# Patient Record
Sex: FEMALE | Race: WHITE | NOT HISPANIC OR LATINO
[De-identification: names, ages, dates, MRNs, and addresses within clinical notes are randomized per-mention and may not be internally consistent; named-entity substitution may affect disease eponyms.]

---

## 2017-09-19 ENCOUNTER — APPOINTMENT (OUTPATIENT)
Dept: OBGYN | Facility: CLINIC | Age: 32
End: 2017-09-19
Payer: MEDICARE

## 2017-09-19 VITALS
WEIGHT: 154.8 LBS | HEIGHT: 64 IN | DIASTOLIC BLOOD PRESSURE: 80 MMHG | HEART RATE: 87 BPM | SYSTOLIC BLOOD PRESSURE: 122 MMHG | BODY MASS INDEX: 26.43 KG/M2

## 2017-09-19 DIAGNOSIS — Z32.01 ENCOUNTER FOR PREGNANCY TEST, RESULT POSITIVE: ICD-10-CM

## 2017-09-19 PROCEDURE — 99395 PREV VISIT EST AGE 18-39: CPT | Mod: GY

## 2017-09-19 PROCEDURE — 99213 OFFICE O/P EST LOW 20 MIN: CPT | Mod: 25

## 2017-09-19 PROCEDURE — 76817 TRANSVAGINAL US OBSTETRIC: CPT

## 2017-09-25 LAB
ABO + RH PNL BLD: NORMAL
B19V IGG SER QL IA: 7.5 INDEX
B19V IGG+IGM SER-IMP: NORMAL
B19V IGG+IGM SER-IMP: POSITIVE
B19V IGM FLD-ACNC: 0.2 INDEX
B19V IGM SER-ACNC: NEGATIVE
BACTERIA UR CULT: ABNORMAL
BASOPHILS # BLD AUTO: 0.01 K/UL
BASOPHILS NFR BLD AUTO: 0.1 %
BLD GP AB SCN SERPL QL: NORMAL
C TRACH RRNA SPEC QL NAA+PROBE: NORMAL
CYTOLOGY CVX/VAG DOC THIN PREP: NORMAL
EOSINOPHIL # BLD AUTO: 0.1 K/UL
EOSINOPHIL NFR BLD AUTO: 1.4 %
HBV SURFACE AG SER QL: NONREACTIVE
HCT VFR BLD CALC: 40.9 %
HCV AB SER QL: NONREACTIVE
HCV S/CO RATIO: 0.15 S/CO
HGB A MFR BLD: 95.5 %
HGB A2 MFR BLD: 2.9 %
HGB BLD-MCNC: 13.7 G/DL
HGB F MFR BLD: 1.6 %
HGB FRACT BLD-IMP: NORMAL
HIV1+2 AB SPEC QL IA.RAPID: NONREACTIVE
HPV HIGH+LOW RISK DNA PNL CVX: NEGATIVE
IMM GRANULOCYTES NFR BLD AUTO: 0.3 %
LYMPHOCYTES # BLD AUTO: 1.46 K/UL
LYMPHOCYTES NFR BLD AUTO: 19.9 %
MAN DIFF?: NORMAL
MCHC RBC-ENTMCNC: 32.5 PG
MCHC RBC-ENTMCNC: 33.5 GM/DL
MCV RBC AUTO: 97.1 FL
MONOCYTES # BLD AUTO: 0.61 K/UL
MONOCYTES NFR BLD AUTO: 8.3 %
N GONORRHOEA RRNA SPEC QL NAA+PROBE: NORMAL
NEUTROPHILS # BLD AUTO: 5.15 K/UL
NEUTROPHILS NFR BLD AUTO: 70 %
PLATELET # BLD AUTO: 271 K/UL
RBC # BLD: 4.21 M/UL
RBC # FLD: 13 %
RPR SER QL: NORMAL
RUBV IGG FLD-ACNC: 2.5 INDEX
RUBV IGG SER-IMP: POSITIVE
SOURCE AMPLIFICATION: NORMAL
VZV AB TITR SER: POSITIVE
VZV IGG SER IF-ACNC: 1476 INDEX
WBC # FLD AUTO: 7.35 K/UL

## 2017-09-26 LAB
AR GENE MUT ANL BLD/T: NORMAL
CFTR MUT TESTED BLD/T: NORMAL
FMR1 GENE MUT ANL BLD/T: NORMAL

## 2017-10-18 ENCOUNTER — LABORATORY RESULT (OUTPATIENT)
Age: 32
End: 2017-10-18

## 2017-10-18 ENCOUNTER — ASOB RESULT (OUTPATIENT)
Age: 32
End: 2017-10-18

## 2017-10-18 ENCOUNTER — APPOINTMENT (OUTPATIENT)
Dept: ANTEPARTUM | Facility: CLINIC | Age: 32
End: 2017-10-18
Payer: MEDICARE

## 2017-10-18 PROCEDURE — 76801 OB US < 14 WKS SINGLE FETUS: CPT

## 2017-10-18 PROCEDURE — 76813 OB US NUCHAL MEAS 1 GEST: CPT

## 2017-10-18 PROCEDURE — 36416 COLLJ CAPILLARY BLOOD SPEC: CPT

## 2017-10-26 ENCOUNTER — APPOINTMENT (OUTPATIENT)
Dept: OBGYN | Facility: CLINIC | Age: 32
End: 2017-10-26
Payer: COMMERCIAL

## 2017-10-26 VITALS
BODY MASS INDEX: 28.34 KG/M2 | DIASTOLIC BLOOD PRESSURE: 79 MMHG | HEIGHT: 64 IN | SYSTOLIC BLOOD PRESSURE: 120 MMHG | WEIGHT: 166 LBS

## 2017-10-26 DIAGNOSIS — O99.89 OTHER SPECIFIED DISEASES AND CONDITIONS COMPLICATING PREGNANCY, CHILDBIRTH AND THE PUERPERIUM: ICD-10-CM

## 2017-10-26 DIAGNOSIS — R82.71 OTHER SPECIFIED DISEASES AND CONDITIONS COMPLICATING PREGNANCY, CHILDBIRTH AND THE PUERPERIUM: ICD-10-CM

## 2017-10-26 PROCEDURE — 0502F SUBSEQUENT PRENATAL CARE: CPT

## 2017-11-20 ENCOUNTER — APPOINTMENT (OUTPATIENT)
Dept: OBGYN | Facility: CLINIC | Age: 32
End: 2017-11-20
Payer: COMMERCIAL

## 2017-11-20 VITALS
WEIGHT: 170 LBS | DIASTOLIC BLOOD PRESSURE: 86 MMHG | HEIGHT: 55 IN | SYSTOLIC BLOOD PRESSURE: 138 MMHG | BODY MASS INDEX: 39.34 KG/M2

## 2017-11-20 PROCEDURE — 0502F SUBSEQUENT PRENATAL CARE: CPT

## 2017-11-20 PROCEDURE — 90471 IMMUNIZATION ADMIN: CPT

## 2017-11-20 PROCEDURE — 90656 IIV3 VACC NO PRSV 0.5 ML IM: CPT

## 2017-11-21 ENCOUNTER — APPOINTMENT (OUTPATIENT)
Dept: ANTEPARTUM | Facility: CLINIC | Age: 32
End: 2017-11-21
Payer: MEDICARE

## 2017-11-21 ENCOUNTER — ASOB RESULT (OUTPATIENT)
Age: 32
End: 2017-11-21

## 2017-11-21 PROCEDURE — 76815 OB US LIMITED FETUS(S): CPT

## 2017-11-21 PROCEDURE — 76817 TRANSVAGINAL US OBSTETRIC: CPT

## 2017-11-27 LAB — BACTERIA UR CULT: NORMAL

## 2017-11-29 ENCOUNTER — APPOINTMENT (OUTPATIENT)
Dept: ANTEPARTUM | Facility: CLINIC | Age: 32
End: 2017-11-29
Payer: MEDICARE

## 2017-11-29 ENCOUNTER — ASOB RESULT (OUTPATIENT)
Age: 32
End: 2017-11-29

## 2017-11-29 PROCEDURE — 76817 TRANSVAGINAL US OBSTETRIC: CPT

## 2017-11-29 PROCEDURE — 76805 OB US >/= 14 WKS SNGL FETUS: CPT

## 2017-12-20 ENCOUNTER — ASOB RESULT (OUTPATIENT)
Age: 32
End: 2017-12-20

## 2017-12-20 ENCOUNTER — APPOINTMENT (OUTPATIENT)
Dept: OBGYN | Facility: CLINIC | Age: 32
End: 2017-12-20
Payer: COMMERCIAL

## 2017-12-20 ENCOUNTER — APPOINTMENT (OUTPATIENT)
Dept: ANTEPARTUM | Facility: CLINIC | Age: 32
End: 2017-12-20
Payer: COMMERCIAL

## 2017-12-20 VITALS
WEIGHT: 176 LBS | DIASTOLIC BLOOD PRESSURE: 71 MMHG | SYSTOLIC BLOOD PRESSURE: 112 MMHG | BODY MASS INDEX: 30.05 KG/M2 | HEIGHT: 64 IN

## 2017-12-20 PROCEDURE — 76817 TRANSVAGINAL US OBSTETRIC: CPT

## 2017-12-20 PROCEDURE — 0502F SUBSEQUENT PRENATAL CARE: CPT

## 2017-12-20 PROCEDURE — 76811 OB US DETAILED SNGL FETUS: CPT

## 2017-12-20 PROCEDURE — 99241 OFFICE CONSULTATION NEW/ESTAB PATIENT 15 MIN: CPT | Mod: 25

## 2017-12-26 ENCOUNTER — APPOINTMENT (OUTPATIENT)
Dept: PEDIATRIC MEDICAL GENETICS | Facility: CLINIC | Age: 32
End: 2017-12-26
Payer: COMMERCIAL

## 2017-12-26 ENCOUNTER — LABORATORY RESULT (OUTPATIENT)
Age: 32
End: 2017-12-26

## 2017-12-26 DIAGNOSIS — O28.9 UNSPECIFIED ABNORMAL FINDINGS ON ANTENATAL SCREENING OF MOTHER: ICD-10-CM

## 2017-12-26 PROCEDURE — 96040: CPT

## 2017-12-26 PROCEDURE — 36415 COLL VENOUS BLD VENIPUNCTURE: CPT

## 2018-01-16 ENCOUNTER — APPOINTMENT (OUTPATIENT)
Dept: OBGYN | Facility: CLINIC | Age: 33
End: 2018-01-16
Payer: COMMERCIAL

## 2018-01-16 VITALS
WEIGHT: 186 LBS | HEIGHT: 64 IN | BODY MASS INDEX: 31.76 KG/M2 | SYSTOLIC BLOOD PRESSURE: 108 MMHG | DIASTOLIC BLOOD PRESSURE: 73 MMHG

## 2018-01-16 DIAGNOSIS — A63.0 ANOGENITAL (VENEREAL) WARTS: ICD-10-CM

## 2018-01-16 PROCEDURE — 56605 BIOPSY OF VULVA/PERINEUM: CPT

## 2018-01-16 PROCEDURE — 0502F SUBSEQUENT PRENATAL CARE: CPT

## 2018-01-30 LAB — CORE LAB BIOPSY: NORMAL

## 2018-02-07 ENCOUNTER — APPOINTMENT (OUTPATIENT)
Dept: OBGYN | Facility: CLINIC | Age: 33
End: 2018-02-07
Payer: COMMERCIAL

## 2018-02-07 ENCOUNTER — LABORATORY RESULT (OUTPATIENT)
Age: 33
End: 2018-02-07

## 2018-02-07 VITALS
WEIGHT: 189 LBS | HEIGHT: 64 IN | DIASTOLIC BLOOD PRESSURE: 63 MMHG | BODY MASS INDEX: 32.27 KG/M2 | SYSTOLIC BLOOD PRESSURE: 116 MMHG

## 2018-02-07 PROCEDURE — 0502F SUBSEQUENT PRENATAL CARE: CPT

## 2018-02-09 LAB
BASOPHILS # BLD AUTO: 0.02 K/UL
BASOPHILS NFR BLD AUTO: 0.2 %
EOSINOPHIL # BLD AUTO: 0.12 K/UL
EOSINOPHIL NFR BLD AUTO: 1.3 %
GLUCOSE 1H P 50 G GLC PO SERPL-MCNC: 97 MG/DL
HCT VFR BLD CALC: 36.3 %
HGB BLD-MCNC: 11.7 G/DL
IMM GRANULOCYTES NFR BLD AUTO: 0.9 %
LYMPHOCYTES # BLD AUTO: 1.71 K/UL
LYMPHOCYTES NFR BLD AUTO: 18.2 %
MAN DIFF?: NORMAL
MCHC RBC-ENTMCNC: 32.2 GM/DL
MCHC RBC-ENTMCNC: 33.1 PG
MCV RBC AUTO: 102.5 FL
MONOCYTES # BLD AUTO: 0.62 K/UL
MONOCYTES NFR BLD AUTO: 6.6 %
NEUTROPHILS # BLD AUTO: 6.84 K/UL
NEUTROPHILS NFR BLD AUTO: 72.8 %
PLATELET # BLD AUTO: 205 K/UL
RBC # BLD: 3.54 M/UL
RBC # FLD: 13.2 %
WBC # FLD AUTO: 9.39 K/UL

## 2018-02-21 ENCOUNTER — APPOINTMENT (OUTPATIENT)
Dept: OBGYN | Facility: CLINIC | Age: 33
End: 2018-02-21
Payer: COMMERCIAL

## 2018-02-21 VITALS
SYSTOLIC BLOOD PRESSURE: 118 MMHG | WEIGHT: 192 LBS | BODY MASS INDEX: 32.78 KG/M2 | HEIGHT: 64 IN | DIASTOLIC BLOOD PRESSURE: 83 MMHG

## 2018-02-21 PROCEDURE — 0502F SUBSEQUENT PRENATAL CARE: CPT

## 2018-02-21 RX ORDER — FUROSEMIDE 20 MG/1
20 TABLET ORAL
Qty: 30 | Refills: 0 | Status: DISCONTINUED | COMMUNITY
Start: 2017-07-03

## 2018-02-21 RX ORDER — AZITHROMYCIN 250 MG/1
250 TABLET, FILM COATED ORAL
Qty: 6 | Refills: 0 | Status: DISCONTINUED | COMMUNITY
Start: 2017-08-23

## 2018-03-14 ENCOUNTER — ASOB RESULT (OUTPATIENT)
Age: 33
End: 2018-03-14

## 2018-03-14 ENCOUNTER — APPOINTMENT (OUTPATIENT)
Dept: ANTEPARTUM | Facility: CLINIC | Age: 33
End: 2018-03-14
Payer: COMMERCIAL

## 2018-03-14 ENCOUNTER — APPOINTMENT (OUTPATIENT)
Dept: OBGYN | Facility: CLINIC | Age: 33
End: 2018-03-14
Payer: COMMERCIAL

## 2018-03-14 VITALS
HEIGHT: 64 IN | BODY MASS INDEX: 33.8 KG/M2 | WEIGHT: 198 LBS | SYSTOLIC BLOOD PRESSURE: 107 MMHG | DIASTOLIC BLOOD PRESSURE: 76 MMHG

## 2018-03-14 PROCEDURE — 76816 OB US FOLLOW-UP PER FETUS: CPT

## 2018-03-14 PROCEDURE — 76819 FETAL BIOPHYS PROFIL W/O NST: CPT

## 2018-03-14 PROCEDURE — 0502F SUBSEQUENT PRENATAL CARE: CPT

## 2018-03-28 ENCOUNTER — APPOINTMENT (OUTPATIENT)
Dept: OBGYN | Facility: CLINIC | Age: 33
End: 2018-03-28
Payer: COMMERCIAL

## 2018-03-28 VITALS
SYSTOLIC BLOOD PRESSURE: 123 MMHG | DIASTOLIC BLOOD PRESSURE: 81 MMHG | BODY MASS INDEX: 34.31 KG/M2 | WEIGHT: 201 LBS | HEIGHT: 64 IN

## 2018-03-28 PROCEDURE — 0502F SUBSEQUENT PRENATAL CARE: CPT

## 2018-04-04 ENCOUNTER — APPOINTMENT (OUTPATIENT)
Dept: OBGYN | Facility: CLINIC | Age: 33
End: 2018-04-04

## 2018-04-18 ENCOUNTER — APPOINTMENT (OUTPATIENT)
Dept: OBGYN | Facility: CLINIC | Age: 33
End: 2018-04-18
Payer: COMMERCIAL

## 2018-04-18 VITALS
BODY MASS INDEX: 35 KG/M2 | WEIGHT: 205 LBS | HEIGHT: 64 IN | SYSTOLIC BLOOD PRESSURE: 125 MMHG | DIASTOLIC BLOOD PRESSURE: 83 MMHG

## 2018-04-18 PROCEDURE — 0502F SUBSEQUENT PRENATAL CARE: CPT

## 2018-04-20 ENCOUNTER — OTHER (OUTPATIENT)
Age: 33
End: 2018-04-20

## 2018-04-20 LAB
GP B STREP DNA SPEC QL NAA+PROBE: DETECTED
GP B STREP DNA SPEC QL NAA+PROBE: NORMAL
SOURCE GBS: NORMAL

## 2018-04-25 ENCOUNTER — APPOINTMENT (OUTPATIENT)
Dept: OBGYN | Facility: CLINIC | Age: 33
End: 2018-04-25
Payer: COMMERCIAL

## 2018-04-25 VITALS
SYSTOLIC BLOOD PRESSURE: 120 MMHG | DIASTOLIC BLOOD PRESSURE: 84 MMHG | HEIGHT: 64 IN | WEIGHT: 204 LBS | BODY MASS INDEX: 34.83 KG/M2

## 2018-04-25 PROCEDURE — 0502F SUBSEQUENT PRENATAL CARE: CPT

## 2018-05-01 ENCOUNTER — CLINICAL ADVICE (OUTPATIENT)
Age: 33
End: 2018-05-01

## 2018-05-02 ENCOUNTER — APPOINTMENT (OUTPATIENT)
Dept: OBGYN | Facility: CLINIC | Age: 33
End: 2018-05-02
Payer: COMMERCIAL

## 2018-05-02 VITALS
HEIGHT: 64 IN | DIASTOLIC BLOOD PRESSURE: 93 MMHG | SYSTOLIC BLOOD PRESSURE: 140 MMHG | WEIGHT: 205.13 LBS | BODY MASS INDEX: 35.02 KG/M2

## 2018-05-02 VITALS — DIASTOLIC BLOOD PRESSURE: 92 MMHG | SYSTOLIC BLOOD PRESSURE: 135 MMHG

## 2018-05-02 DIAGNOSIS — Z00.00 ENCOUNTER FOR GENERAL ADULT MEDICAL EXAMINATION W/OUT ABNORMAL FINDINGS: ICD-10-CM

## 2018-05-02 PROCEDURE — 0502F SUBSEQUENT PRENATAL CARE: CPT

## 2018-05-04 ENCOUNTER — APPOINTMENT (OUTPATIENT)
Dept: OBGYN | Facility: CLINIC | Age: 33
End: 2018-05-04

## 2018-05-07 ENCOUNTER — APPOINTMENT (OUTPATIENT)
Dept: OBGYN | Facility: CLINIC | Age: 33
End: 2018-05-07

## 2018-05-07 LAB
ALBUMIN SERPL ELPH-MCNC: 3.6 G/DL
ALP BLD-CCNC: 120 U/L
ALT SERPL-CCNC: 15 U/L
ANION GAP SERPL CALC-SCNC: 15 MMOL/L
AST SERPL-CCNC: 20 U/L
BASOPHILS # BLD AUTO: 0.01 K/UL
BASOPHILS NFR BLD AUTO: 0.1 %
BILIRUB SERPL-MCNC: <0.2 MG/DL
BUN SERPL-MCNC: 15 MG/DL
CALCIUM SERPL-MCNC: 9.2 MG/DL
CHLORIDE SERPL-SCNC: 103 MMOL/L
CO2 SERPL-SCNC: 21 MMOL/L
CREAT SERPL-MCNC: 0.76 MG/DL
EOSINOPHIL # BLD AUTO: 0.17 K/UL
EOSINOPHIL NFR BLD AUTO: 1.8 %
GLUCOSE SERPL-MCNC: 101 MG/DL
HCT VFR BLD CALC: 37.9 %
HGB BLD-MCNC: 12.4 G/DL
IMM GRANULOCYTES NFR BLD AUTO: 0.5 %
LYMPHOCYTES # BLD AUTO: 2.07 K/UL
LYMPHOCYTES NFR BLD AUTO: 21.4 %
MAN DIFF?: NORMAL
MCHC RBC-ENTMCNC: 32.7 GM/DL
MCHC RBC-ENTMCNC: 34.1 PG
MCV RBC AUTO: 104.1 FL
MONOCYTES # BLD AUTO: 0.76 K/UL
MONOCYTES NFR BLD AUTO: 7.9 %
NEUTROPHILS # BLD AUTO: 6.61 K/UL
NEUTROPHILS NFR BLD AUTO: 68.3 %
PLATELET # BLD AUTO: 196 K/UL
POTASSIUM SERPL-SCNC: 4.3 MMOL/L
PROT SERPL-MCNC: 6 G/DL
RBC # BLD: 3.64 M/UL
RBC # FLD: 13.8 %
SODIUM SERPL-SCNC: 139 MMOL/L
URATE SERPL-MCNC: 4 MG/DL
WBC # FLD AUTO: 9.67 K/UL

## 2018-05-09 ENCOUNTER — APPOINTMENT (OUTPATIENT)
Dept: OBGYN | Facility: CLINIC | Age: 33
End: 2018-05-09
Payer: COMMERCIAL

## 2018-05-09 VITALS
DIASTOLIC BLOOD PRESSURE: 84 MMHG | BODY MASS INDEX: 34.92 KG/M2 | SYSTOLIC BLOOD PRESSURE: 157 MMHG | WEIGHT: 204.56 LBS | HEIGHT: 64 IN

## 2018-05-09 DIAGNOSIS — Z34.01 ENCOUNTER FOR SUPERVISION OF NORMAL FIRST PREGNANCY, FIRST TRIMESTER: ICD-10-CM

## 2018-05-09 LAB
CREAT 24H UR-MCNC: 1.7 G/24 H
CREAT ?TM UR-MCNC: 93 MG/DL
PROT 24H UR-MRATE: 7 MG/DL
PROT ?TM UR-MCNC: 24 HR
PROT UR-MCNC: 126 MG/24 H
SPECIMEN VOL 24H UR: 1800 ML

## 2018-05-09 PROCEDURE — 0502F SUBSEQUENT PRENATAL CARE: CPT

## 2018-05-15 ENCOUNTER — ASOB RESULT (OUTPATIENT)
Age: 33
End: 2018-05-15

## 2018-05-15 ENCOUNTER — APPOINTMENT (OUTPATIENT)
Dept: ANTEPARTUM | Facility: HOSPITAL | Age: 33
End: 2018-05-15

## 2018-05-15 ENCOUNTER — APPOINTMENT (OUTPATIENT)
Dept: ANTEPARTUM | Facility: CLINIC | Age: 33
End: 2018-05-15
Payer: COMMERCIAL

## 2018-05-15 ENCOUNTER — OUTPATIENT (OUTPATIENT)
Dept: OUTPATIENT SERVICES | Facility: HOSPITAL | Age: 33
LOS: 1 days | End: 2018-05-15

## 2018-05-15 PROCEDURE — 76818 FETAL BIOPHYS PROFILE W/NST: CPT | Mod: 26

## 2018-05-15 PROCEDURE — 76820 UMBILICAL ARTERY ECHO: CPT

## 2018-05-15 PROCEDURE — 76816 OB US FOLLOW-UP PER FETUS: CPT

## 2018-05-16 ENCOUNTER — APPOINTMENT (OUTPATIENT)
Dept: OBGYN | Facility: CLINIC | Age: 33
End: 2018-05-16

## 2018-05-16 ENCOUNTER — INPATIENT (INPATIENT)
Facility: HOSPITAL | Age: 33
LOS: 2 days | Discharge: HOME | End: 2018-05-19
Attending: OBSTETRICS & GYNECOLOGY | Admitting: OBSTETRICS & GYNECOLOGY
Payer: COMMERCIAL

## 2018-05-16 VITALS — WEIGHT: 184.97 LBS | HEIGHT: 64 IN

## 2018-05-16 DIAGNOSIS — O36.5990 MATERNAL CARE FOR OTHER KNOWN OR SUSPECTED POOR FETAL GROWTH, UNSPECIFIED TRIMESTER, NOT APPLICABLE OR UNSPECIFIED: ICD-10-CM

## 2018-05-16 DIAGNOSIS — O26.899 OTHER SPECIFIED PREGNANCY RELATED CONDITIONS, UNSPECIFIED TRIMESTER: ICD-10-CM

## 2018-05-16 DIAGNOSIS — Z3A.00 WEEKS OF GESTATION OF PREGNANCY NOT SPECIFIED: ICD-10-CM

## 2018-05-16 LAB
ALBUMIN SERPL ELPH-MCNC: 3.5 G/DL — SIGNIFICANT CHANGE UP (ref 3.3–5)
ALP SERPL-CCNC: 121 U/L — HIGH (ref 40–120)
ALT FLD-CCNC: 16 U/L — SIGNIFICANT CHANGE UP (ref 4–33)
APPEARANCE UR: SIGNIFICANT CHANGE UP
APTT BLD: 23.3 SEC — LOW (ref 27.5–37.4)
AST SERPL-CCNC: 24 U/L — SIGNIFICANT CHANGE UP (ref 4–32)
BACTERIA # UR AUTO: SIGNIFICANT CHANGE UP
BASOPHILS # BLD AUTO: 0.02 K/UL — SIGNIFICANT CHANGE UP (ref 0–0.2)
BASOPHILS NFR BLD AUTO: 0.2 % — SIGNIFICANT CHANGE UP (ref 0–2)
BILIRUB SERPL-MCNC: < 0.2 MG/DL — LOW (ref 0.2–1.2)
BILIRUB UR-MCNC: NEGATIVE — SIGNIFICANT CHANGE UP
BLD GP AB SCN SERPL QL: NEGATIVE — SIGNIFICANT CHANGE UP
BLOOD UR QL VISUAL: HIGH
BUN SERPL-MCNC: 18 MG/DL — SIGNIFICANT CHANGE UP (ref 7–23)
CALCIUM SERPL-MCNC: 8.9 MG/DL — SIGNIFICANT CHANGE UP (ref 8.4–10.5)
CHLORIDE SERPL-SCNC: 102 MMOL/L — SIGNIFICANT CHANGE UP (ref 98–107)
CO2 SERPL-SCNC: 19 MMOL/L — LOW (ref 22–31)
COLOR SPEC: YELLOW — SIGNIFICANT CHANGE UP
CREAT ?TM UR-MCNC: 78.15 MG/DL — SIGNIFICANT CHANGE UP
CREAT SERPL-MCNC: 0.67 MG/DL — SIGNIFICANT CHANGE UP (ref 0.5–1.3)
EOSINOPHIL # BLD AUTO: 0.12 K/UL — SIGNIFICANT CHANGE UP (ref 0–0.5)
EOSINOPHIL NFR BLD AUTO: 1.2 % — SIGNIFICANT CHANGE UP (ref 0–6)
FIBRINOGEN PPP-MCNC: 637 MG/DL — HIGH (ref 310–510)
GLUCOSE SERPL-MCNC: 80 MG/DL — SIGNIFICANT CHANGE UP (ref 70–99)
GLUCOSE UR-MCNC: NEGATIVE — SIGNIFICANT CHANGE UP
HCT VFR BLD CALC: 35.4 % — SIGNIFICANT CHANGE UP (ref 34.5–45)
HGB BLD-MCNC: 11.7 G/DL — SIGNIFICANT CHANGE UP (ref 11.5–15.5)
IMM GRANULOCYTES # BLD AUTO: 0.04 # — SIGNIFICANT CHANGE UP
IMM GRANULOCYTES NFR BLD AUTO: 0.4 % — SIGNIFICANT CHANGE UP (ref 0–1.5)
INR BLD: 0.87 — LOW (ref 0.88–1.17)
KETONES UR-MCNC: NEGATIVE — SIGNIFICANT CHANGE UP
LDH SERPL L TO P-CCNC: 265 U/L — HIGH (ref 135–225)
LEUKOCYTE ESTERASE UR-ACNC: HIGH
LYMPHOCYTES # BLD AUTO: 1.67 K/UL — SIGNIFICANT CHANGE UP (ref 1–3.3)
LYMPHOCYTES # BLD AUTO: 16.1 % — SIGNIFICANT CHANGE UP (ref 13–44)
MCHC RBC-ENTMCNC: 33.1 % — SIGNIFICANT CHANGE UP (ref 32–36)
MCHC RBC-ENTMCNC: 33.8 PG — SIGNIFICANT CHANGE UP (ref 27–34)
MCV RBC AUTO: 102.3 FL — HIGH (ref 80–100)
MONOCYTES # BLD AUTO: 0.77 K/UL — SIGNIFICANT CHANGE UP (ref 0–0.9)
MONOCYTES NFR BLD AUTO: 7.4 % — SIGNIFICANT CHANGE UP (ref 2–14)
MUCOUS THREADS # UR AUTO: SIGNIFICANT CHANGE UP
NEUTROPHILS # BLD AUTO: 7.74 K/UL — HIGH (ref 1.8–7.4)
NEUTROPHILS NFR BLD AUTO: 74.7 % — SIGNIFICANT CHANGE UP (ref 43–77)
NITRITE UR-MCNC: NEGATIVE — SIGNIFICANT CHANGE UP
NRBC # FLD: 0 — SIGNIFICANT CHANGE UP
PH UR: 6.5 — SIGNIFICANT CHANGE UP (ref 4.6–8)
PLATELET # BLD AUTO: 181 K/UL — SIGNIFICANT CHANGE UP (ref 150–400)
PMV BLD: 12.3 FL — SIGNIFICANT CHANGE UP (ref 7–13)
POTASSIUM SERPL-MCNC: 3.9 MMOL/L — SIGNIFICANT CHANGE UP (ref 3.5–5.3)
POTASSIUM SERPL-SCNC: 3.9 MMOL/L — SIGNIFICANT CHANGE UP (ref 3.5–5.3)
PROT SERPL-MCNC: 6.4 G/DL — SIGNIFICANT CHANGE UP (ref 6–8.3)
PROT UR-MCNC: 20 MG/DL — SIGNIFICANT CHANGE UP
PROT UR-MCNC: 28.9 MG/DL — SIGNIFICANT CHANGE UP
PROTHROM AB SERPL-ACNC: 9.6 SEC — LOW (ref 9.8–13.1)
RBC # BLD: 3.46 M/UL — LOW (ref 3.8–5.2)
RBC # FLD: 13.2 % — SIGNIFICANT CHANGE UP (ref 10.3–14.5)
RBC CASTS # UR COMP ASSIST: SIGNIFICANT CHANGE UP (ref 0–?)
RH IG SCN BLD-IMP: POSITIVE — SIGNIFICANT CHANGE UP
RH IG SCN BLD-IMP: POSITIVE — SIGNIFICANT CHANGE UP
SODIUM SERPL-SCNC: 135 MMOL/L — SIGNIFICANT CHANGE UP (ref 135–145)
SP GR SPEC: 1.02 — SIGNIFICANT CHANGE UP (ref 1–1.04)
SQUAMOUS # UR AUTO: SIGNIFICANT CHANGE UP
URATE SERPL-MCNC: 3.9 MG/DL — SIGNIFICANT CHANGE UP (ref 2.5–7)
UROBILINOGEN FLD QL: NORMAL MG/DL — SIGNIFICANT CHANGE UP
WBC # BLD: 10.36 K/UL — SIGNIFICANT CHANGE UP (ref 3.8–10.5)
WBC # FLD AUTO: 10.36 K/UL — SIGNIFICANT CHANGE UP (ref 3.8–10.5)
WBC UR QL: SIGNIFICANT CHANGE UP (ref 0–?)

## 2018-05-16 RX ORDER — SODIUM CHLORIDE 9 MG/ML
1000 INJECTION, SOLUTION INTRAVENOUS ONCE
Qty: 0 | Refills: 0 | Status: COMPLETED | OUTPATIENT
Start: 2018-05-16 | End: 2018-05-17

## 2018-05-16 RX ORDER — OXYTOCIN 10 UNIT/ML
2 VIAL (ML) INJECTION
Qty: 30 | Refills: 0 | Status: DISCONTINUED | OUTPATIENT
Start: 2018-05-16 | End: 2018-05-17

## 2018-05-16 RX ORDER — SODIUM CHLORIDE 9 MG/ML
1000 INJECTION, SOLUTION INTRAVENOUS
Qty: 0 | Refills: 0 | Status: DISCONTINUED | OUTPATIENT
Start: 2018-05-16 | End: 2018-05-17

## 2018-05-16 RX ORDER — CITRIC ACID/SODIUM CITRATE 300-500 MG
15 SOLUTION, ORAL ORAL EVERY 4 HOURS
Qty: 0 | Refills: 0 | Status: DISCONTINUED | OUTPATIENT
Start: 2018-05-16 | End: 2018-05-17

## 2018-05-16 RX ORDER — AMPICILLIN TRIHYDRATE 250 MG
2 CAPSULE ORAL ONCE
Qty: 0 | Refills: 0 | Status: COMPLETED | OUTPATIENT
Start: 2018-05-16 | End: 2018-05-16

## 2018-05-16 RX ORDER — AMPICILLIN TRIHYDRATE 250 MG
1 CAPSULE ORAL EVERY 4 HOURS
Qty: 0 | Refills: 0 | Status: DISCONTINUED | OUTPATIENT
Start: 2018-05-16 | End: 2018-05-17

## 2018-05-16 RX ORDER — CITRIC ACID/SODIUM CITRATE 300-500 MG
15 SOLUTION, ORAL ORAL EVERY 4 HOURS
Qty: 0 | Refills: 0 | Status: DISCONTINUED | OUTPATIENT
Start: 2018-05-16 | End: 2018-05-16

## 2018-05-16 RX ORDER — OXYTOCIN 10 UNIT/ML
333.33 VIAL (ML) INJECTION
Qty: 20 | Refills: 0 | Status: COMPLETED | OUTPATIENT
Start: 2018-05-16

## 2018-05-16 RX ORDER — SODIUM CHLORIDE 9 MG/ML
1000 INJECTION, SOLUTION INTRAVENOUS ONCE
Qty: 0 | Refills: 0 | Status: DISCONTINUED | OUTPATIENT
Start: 2018-05-16 | End: 2018-05-16

## 2018-05-16 RX ORDER — SODIUM CHLORIDE 9 MG/ML
1000 INJECTION, SOLUTION INTRAVENOUS
Qty: 0 | Refills: 0 | Status: DISCONTINUED | OUTPATIENT
Start: 2018-05-16 | End: 2018-05-16

## 2018-05-16 RX ORDER — OXYTOCIN 10 UNIT/ML
333.33 VIAL (ML) INJECTION
Qty: 20 | Refills: 0 | Status: DISCONTINUED | OUTPATIENT
Start: 2018-05-16 | End: 2018-05-16

## 2018-05-16 RX ORDER — AMPICILLIN TRIHYDRATE 250 MG
CAPSULE ORAL
Qty: 0 | Refills: 0 | Status: DISCONTINUED | OUTPATIENT
Start: 2018-05-16 | End: 2018-05-17

## 2018-05-16 RX ADMIN — SODIUM CHLORIDE 125 MILLILITER(S): 9 INJECTION, SOLUTION INTRAVENOUS at 18:00

## 2018-05-16 RX ADMIN — Medication 216 GRAM(S): at 16:00

## 2018-05-16 RX ADMIN — Medication 208 GRAM(S): at 20:14

## 2018-05-17 ENCOUNTER — TRANSCRIPTION ENCOUNTER (OUTPATIENT)
Age: 33
End: 2018-05-17

## 2018-05-17 LAB
ALBUMIN SERPL ELPH-MCNC: 2.9 G/DL — LOW (ref 3.3–5)
ALBUMIN SERPL ELPH-MCNC: 3.3 G/DL — SIGNIFICANT CHANGE UP (ref 3.3–5)
ALP SERPL-CCNC: 100 U/L — SIGNIFICANT CHANGE UP (ref 40–120)
ALP SERPL-CCNC: 114 U/L — SIGNIFICANT CHANGE UP (ref 40–120)
ALT FLD-CCNC: 12 U/L — SIGNIFICANT CHANGE UP (ref 4–33)
ALT FLD-CCNC: 17 U/L — SIGNIFICANT CHANGE UP (ref 4–33)
APTT BLD: 22.3 SEC — LOW (ref 27.5–37.4)
APTT BLD: 24.2 SEC — LOW (ref 27.5–37.4)
AST SERPL-CCNC: 19 U/L — SIGNIFICANT CHANGE UP (ref 4–32)
AST SERPL-CCNC: 21 U/L — SIGNIFICANT CHANGE UP (ref 4–32)
BASOPHILS # BLD AUTO: 0.02 K/UL — SIGNIFICANT CHANGE UP (ref 0–0.2)
BASOPHILS # BLD AUTO: 0.02 K/UL — SIGNIFICANT CHANGE UP (ref 0–0.2)
BASOPHILS NFR BLD AUTO: 0.1 % — SIGNIFICANT CHANGE UP (ref 0–2)
BASOPHILS NFR BLD AUTO: 0.2 % — SIGNIFICANT CHANGE UP (ref 0–2)
BILIRUB SERPL-MCNC: 0.4 MG/DL — SIGNIFICANT CHANGE UP (ref 0.2–1.2)
BILIRUB SERPL-MCNC: 0.5 MG/DL — SIGNIFICANT CHANGE UP (ref 0.2–1.2)
BUN SERPL-MCNC: 11 MG/DL — SIGNIFICANT CHANGE UP (ref 7–23)
BUN SERPL-MCNC: 12 MG/DL — SIGNIFICANT CHANGE UP (ref 7–23)
CALCIUM SERPL-MCNC: 8.4 MG/DL — SIGNIFICANT CHANGE UP (ref 8.4–10.5)
CALCIUM SERPL-MCNC: 8.9 MG/DL — SIGNIFICANT CHANGE UP (ref 8.4–10.5)
CHLORIDE SERPL-SCNC: 102 MMOL/L — SIGNIFICANT CHANGE UP (ref 98–107)
CHLORIDE SERPL-SCNC: 103 MMOL/L — SIGNIFICANT CHANGE UP (ref 98–107)
CO2 SERPL-SCNC: 22 MMOL/L — SIGNIFICANT CHANGE UP (ref 22–31)
CO2 SERPL-SCNC: 23 MMOL/L — SIGNIFICANT CHANGE UP (ref 22–31)
CREAT SERPL-MCNC: 0.72 MG/DL — SIGNIFICANT CHANGE UP (ref 0.5–1.3)
CREAT SERPL-MCNC: 0.77 MG/DL — SIGNIFICANT CHANGE UP (ref 0.5–1.3)
EOSINOPHIL # BLD AUTO: 0.03 K/UL — SIGNIFICANT CHANGE UP (ref 0–0.5)
EOSINOPHIL # BLD AUTO: 0.05 K/UL — SIGNIFICANT CHANGE UP (ref 0–0.5)
EOSINOPHIL NFR BLD AUTO: 0.2 % — SIGNIFICANT CHANGE UP (ref 0–6)
EOSINOPHIL NFR BLD AUTO: 0.4 % — SIGNIFICANT CHANGE UP (ref 0–6)
FIBRINOGEN PPP-MCNC: 606.1 MG/DL — HIGH (ref 310–510)
FIBRINOGEN PPP-MCNC: 609 MG/DL — HIGH (ref 310–510)
GLUCOSE SERPL-MCNC: 79 MG/DL — SIGNIFICANT CHANGE UP (ref 70–99)
GLUCOSE SERPL-MCNC: 96 MG/DL — SIGNIFICANT CHANGE UP (ref 70–99)
HCT VFR BLD CALC: 31 % — LOW (ref 34.5–45)
HCT VFR BLD CALC: 35.1 % — SIGNIFICANT CHANGE UP (ref 34.5–45)
HGB BLD-MCNC: 10.6 G/DL — LOW (ref 11.5–15.5)
HGB BLD-MCNC: 11.7 G/DL — SIGNIFICANT CHANGE UP (ref 11.5–15.5)
IMM GRANULOCYTES # BLD AUTO: 0.05 # — SIGNIFICANT CHANGE UP
IMM GRANULOCYTES # BLD AUTO: 0.06 # — SIGNIFICANT CHANGE UP
IMM GRANULOCYTES NFR BLD AUTO: 0.4 % — SIGNIFICANT CHANGE UP (ref 0–1.5)
IMM GRANULOCYTES NFR BLD AUTO: 0.4 % — SIGNIFICANT CHANGE UP (ref 0–1.5)
INR BLD: 0.88 — SIGNIFICANT CHANGE UP (ref 0.88–1.17)
INR BLD: 0.92 — SIGNIFICANT CHANGE UP (ref 0.88–1.17)
LDH SERPL L TO P-CCNC: 178 U/L — SIGNIFICANT CHANGE UP (ref 135–225)
LDH SERPL L TO P-CCNC: 178 U/L — SIGNIFICANT CHANGE UP (ref 135–225)
LYMPHOCYTES # BLD AUTO: 0.89 K/UL — LOW (ref 1–3.3)
LYMPHOCYTES # BLD AUTO: 1.56 K/UL — SIGNIFICANT CHANGE UP (ref 1–3.3)
LYMPHOCYTES # BLD AUTO: 14 % — SIGNIFICANT CHANGE UP (ref 13–44)
LYMPHOCYTES # BLD AUTO: 6.1 % — LOW (ref 13–44)
MCHC RBC-ENTMCNC: 33.3 % — SIGNIFICANT CHANGE UP (ref 32–36)
MCHC RBC-ENTMCNC: 33.6 PG — SIGNIFICANT CHANGE UP (ref 27–34)
MCHC RBC-ENTMCNC: 34.2 % — SIGNIFICANT CHANGE UP (ref 32–36)
MCHC RBC-ENTMCNC: 34.4 PG — HIGH (ref 27–34)
MCV RBC AUTO: 100.6 FL — HIGH (ref 80–100)
MCV RBC AUTO: 100.9 FL — HIGH (ref 80–100)
MONOCYTES # BLD AUTO: 0.62 K/UL — SIGNIFICANT CHANGE UP (ref 0–0.9)
MONOCYTES # BLD AUTO: 0.8 K/UL — SIGNIFICANT CHANGE UP (ref 0–0.9)
MONOCYTES NFR BLD AUTO: 5.5 % — SIGNIFICANT CHANGE UP (ref 2–14)
MONOCYTES NFR BLD AUTO: 5.5 % — SIGNIFICANT CHANGE UP (ref 2–14)
NEUTROPHILS # BLD AUTO: 12.7 K/UL — HIGH (ref 1.8–7.4)
NEUTROPHILS # BLD AUTO: 8.88 K/UL — HIGH (ref 1.8–7.4)
NEUTROPHILS NFR BLD AUTO: 79.5 % — HIGH (ref 43–77)
NEUTROPHILS NFR BLD AUTO: 87.7 % — HIGH (ref 43–77)
NRBC # FLD: 0 — SIGNIFICANT CHANGE UP
NRBC # FLD: 0 — SIGNIFICANT CHANGE UP
PLATELET # BLD AUTO: 164 K/UL — SIGNIFICANT CHANGE UP (ref 150–400)
PLATELET # BLD AUTO: 195 K/UL — SIGNIFICANT CHANGE UP (ref 150–400)
PMV BLD: 11.8 FL — SIGNIFICANT CHANGE UP (ref 7–13)
PMV BLD: 11.9 FL — SIGNIFICANT CHANGE UP (ref 7–13)
POTASSIUM SERPL-MCNC: 3.6 MMOL/L — SIGNIFICANT CHANGE UP (ref 3.5–5.3)
POTASSIUM SERPL-MCNC: 4.4 MMOL/L — SIGNIFICANT CHANGE UP (ref 3.5–5.3)
POTASSIUM SERPL-SCNC: 3.6 MMOL/L — SIGNIFICANT CHANGE UP (ref 3.5–5.3)
POTASSIUM SERPL-SCNC: 4.4 MMOL/L — SIGNIFICANT CHANGE UP (ref 3.5–5.3)
PROT SERPL-MCNC: 5.3 G/DL — LOW (ref 6–8.3)
PROT SERPL-MCNC: 6.2 G/DL — SIGNIFICANT CHANGE UP (ref 6–8.3)
PROTHROM AB SERPL-ACNC: 10.6 SEC — SIGNIFICANT CHANGE UP (ref 9.8–13.1)
PROTHROM AB SERPL-ACNC: 9.7 SEC — LOW (ref 9.8–13.1)
RBC # BLD: 3.08 M/UL — LOW (ref 3.8–5.2)
RBC # BLD: 3.48 M/UL — LOW (ref 3.8–5.2)
RBC # FLD: 13.2 % — SIGNIFICANT CHANGE UP (ref 10.3–14.5)
RBC # FLD: 13.2 % — SIGNIFICANT CHANGE UP (ref 10.3–14.5)
SODIUM SERPL-SCNC: 132 MMOL/L — LOW (ref 135–145)
SODIUM SERPL-SCNC: 135 MMOL/L — SIGNIFICANT CHANGE UP (ref 135–145)
T PALLIDUM AB TITR SER: NEGATIVE — SIGNIFICANT CHANGE UP
URATE SERPL-MCNC: 4.2 MG/DL — SIGNIFICANT CHANGE UP (ref 2.5–7)
URATE SERPL-MCNC: 4.4 MG/DL — SIGNIFICANT CHANGE UP (ref 2.5–7)
WBC # BLD: 11.18 K/UL — HIGH (ref 3.8–10.5)
WBC # BLD: 14.5 K/UL — HIGH (ref 3.8–10.5)
WBC # FLD AUTO: 11.18 K/UL — HIGH (ref 3.8–10.5)
WBC # FLD AUTO: 14.5 K/UL — HIGH (ref 3.8–10.5)

## 2018-05-17 PROCEDURE — 59400 OBSTETRICAL CARE: CPT | Mod: U9,UB,GC

## 2018-05-17 RX ORDER — IBUPROFEN 200 MG
600 TABLET ORAL EVERY 6 HOURS
Qty: 0 | Refills: 0 | Status: DISCONTINUED | OUTPATIENT
Start: 2018-05-17 | End: 2018-05-19

## 2018-05-17 RX ORDER — OXYTOCIN 10 UNIT/ML
41.67 VIAL (ML) INJECTION
Qty: 20 | Refills: 0 | Status: DISCONTINUED | OUTPATIENT
Start: 2018-05-17 | End: 2018-05-17

## 2018-05-17 RX ORDER — OXYTOCIN 10 UNIT/ML
41.67 VIAL (ML) INJECTION
Qty: 20 | Refills: 0 | Status: DISCONTINUED | OUTPATIENT
Start: 2018-05-17 | End: 2018-05-18

## 2018-05-17 RX ORDER — SODIUM CHLORIDE 9 MG/ML
3 INJECTION INTRAMUSCULAR; INTRAVENOUS; SUBCUTANEOUS EVERY 8 HOURS
Qty: 0 | Refills: 0 | Status: DISCONTINUED | OUTPATIENT
Start: 2018-05-17 | End: 2018-05-19

## 2018-05-17 RX ORDER — KETOROLAC TROMETHAMINE 30 MG/ML
30 SYRINGE (ML) INJECTION ONCE
Qty: 0 | Refills: 0 | Status: DISCONTINUED | OUTPATIENT
Start: 2018-05-17 | End: 2018-05-17

## 2018-05-17 RX ORDER — TETANUS TOXOID, REDUCED DIPHTHERIA TOXOID AND ACELLULAR PERTUSSIS VACCINE, ADSORBED 5; 2.5; 8; 8; 2.5 [IU]/.5ML; [IU]/.5ML; UG/.5ML; UG/.5ML; UG/.5ML
0.5 SUSPENSION INTRAMUSCULAR ONCE
Qty: 0 | Refills: 0 | Status: COMPLETED | OUTPATIENT
Start: 2018-05-17

## 2018-05-17 RX ORDER — SODIUM CHLORIDE 9 MG/ML
1000 INJECTION, SOLUTION INTRAVENOUS
Qty: 0 | Refills: 0 | Status: DISCONTINUED | OUTPATIENT
Start: 2018-05-17 | End: 2018-05-17

## 2018-05-17 RX ORDER — HYDROCORTISONE 1 %
1 OINTMENT (GRAM) TOPICAL EVERY 4 HOURS
Qty: 0 | Refills: 0 | Status: DISCONTINUED | OUTPATIENT
Start: 2018-05-17 | End: 2018-05-19

## 2018-05-17 RX ORDER — HYDROCORTISONE 1 %
1 OINTMENT (GRAM) TOPICAL EVERY 4 HOURS
Qty: 0 | Refills: 0 | Status: DISCONTINUED | OUTPATIENT
Start: 2018-05-17 | End: 2018-05-17

## 2018-05-17 RX ORDER — LANOLIN
1 OINTMENT (GRAM) TOPICAL EVERY 6 HOURS
Qty: 0 | Refills: 0 | Status: DISCONTINUED | OUTPATIENT
Start: 2018-05-17 | End: 2018-05-19

## 2018-05-17 RX ORDER — IBUPROFEN 200 MG
600 TABLET ORAL EVERY 6 HOURS
Qty: 0 | Refills: 0 | Status: COMPLETED | OUTPATIENT
Start: 2018-05-17 | End: 2019-04-15

## 2018-05-17 RX ORDER — DIPHENHYDRAMINE HCL 50 MG
25 CAPSULE ORAL EVERY 6 HOURS
Qty: 0 | Refills: 0 | Status: DISCONTINUED | OUTPATIENT
Start: 2018-05-17 | End: 2018-05-19

## 2018-05-17 RX ORDER — IBUPROFEN 200 MG
1 TABLET ORAL
Qty: 0 | Refills: 0 | DISCHARGE
Start: 2018-05-17

## 2018-05-17 RX ORDER — DIBUCAINE 1 %
1 OINTMENT (GRAM) RECTAL EVERY 4 HOURS
Qty: 0 | Refills: 0 | Status: DISCONTINUED | OUTPATIENT
Start: 2018-05-17 | End: 2018-05-19

## 2018-05-17 RX ORDER — AER TRAVELER 0.5 G/1
1 SOLUTION RECTAL; TOPICAL EVERY 4 HOURS
Qty: 0 | Refills: 0 | Status: DISCONTINUED | OUTPATIENT
Start: 2018-05-17 | End: 2018-05-19

## 2018-05-17 RX ORDER — DIBUCAINE 1 %
1 OINTMENT (GRAM) RECTAL EVERY 4 HOURS
Qty: 0 | Refills: 0 | Status: DISCONTINUED | OUTPATIENT
Start: 2018-05-17 | End: 2018-05-17

## 2018-05-17 RX ORDER — PRAMOXINE HYDROCHLORIDE 150 MG/15G
1 AEROSOL, FOAM RECTAL EVERY 4 HOURS
Qty: 0 | Refills: 0 | Status: DISCONTINUED | OUTPATIENT
Start: 2018-05-17 | End: 2018-05-17

## 2018-05-17 RX ORDER — OXYCODONE HYDROCHLORIDE 5 MG/1
5 TABLET ORAL
Qty: 0 | Refills: 0 | Status: DISCONTINUED | OUTPATIENT
Start: 2018-05-17 | End: 2018-05-19

## 2018-05-17 RX ORDER — DOCUSATE SODIUM 100 MG
100 CAPSULE ORAL
Qty: 0 | Refills: 0 | Status: DISCONTINUED | OUTPATIENT
Start: 2018-05-17 | End: 2018-05-19

## 2018-05-17 RX ORDER — ACETAMINOPHEN 500 MG
975 TABLET ORAL EVERY 6 HOURS
Qty: 0 | Refills: 0 | Status: DISCONTINUED | OUTPATIENT
Start: 2018-05-17 | End: 2018-05-19

## 2018-05-17 RX ORDER — MAGNESIUM HYDROXIDE 400 MG/1
30 TABLET, CHEWABLE ORAL
Qty: 0 | Refills: 0 | Status: DISCONTINUED | OUTPATIENT
Start: 2018-05-17 | End: 2018-05-19

## 2018-05-17 RX ORDER — SODIUM CHLORIDE 9 MG/ML
3 INJECTION INTRAMUSCULAR; INTRAVENOUS; SUBCUTANEOUS EVERY 8 HOURS
Qty: 0 | Refills: 0 | Status: DISCONTINUED | OUTPATIENT
Start: 2018-05-17 | End: 2018-05-17

## 2018-05-17 RX ORDER — OXYTOCIN 10 UNIT/ML
333.33 VIAL (ML) INJECTION
Qty: 20 | Refills: 0 | Status: COMPLETED | OUTPATIENT
Start: 2018-05-17 | End: 2018-05-17

## 2018-05-17 RX ORDER — ACETAMINOPHEN 500 MG
975 TABLET ORAL EVERY 6 HOURS
Qty: 0 | Refills: 0 | Status: COMPLETED | OUTPATIENT
Start: 2018-05-17 | End: 2019-04-15

## 2018-05-17 RX ORDER — AER TRAVELER 0.5 G/1
1 SOLUTION RECTAL; TOPICAL EVERY 4 HOURS
Qty: 0 | Refills: 0 | Status: DISCONTINUED | OUTPATIENT
Start: 2018-05-17 | End: 2018-05-17

## 2018-05-17 RX ORDER — IBUPROFEN 200 MG
1 TABLET ORAL
Qty: 0 | Refills: 0 | COMMUNITY
Start: 2018-05-17

## 2018-05-17 RX ORDER — SIMETHICONE 80 MG/1
80 TABLET, CHEWABLE ORAL EVERY 6 HOURS
Qty: 0 | Refills: 0 | Status: DISCONTINUED | OUTPATIENT
Start: 2018-05-17 | End: 2018-05-19

## 2018-05-17 RX ORDER — GLYCERIN ADULT
1 SUPPOSITORY, RECTAL RECTAL AT BEDTIME
Qty: 0 | Refills: 0 | Status: DISCONTINUED | OUTPATIENT
Start: 2018-05-17 | End: 2018-05-19

## 2018-05-17 RX ORDER — OXYCODONE HYDROCHLORIDE 5 MG/1
5 TABLET ORAL EVERY 4 HOURS
Qty: 0 | Refills: 0 | Status: DISCONTINUED | OUTPATIENT
Start: 2018-05-17 | End: 2018-05-19

## 2018-05-17 RX ADMIN — Medication 208 GRAM(S): at 03:59

## 2018-05-17 RX ADMIN — Medication 975 MILLIGRAM(S): at 19:39

## 2018-05-17 RX ADMIN — Medication 600 MILLIGRAM(S): at 19:00

## 2018-05-17 RX ADMIN — Medication 208 GRAM(S): at 08:08

## 2018-05-17 RX ADMIN — Medication 125 MILLIUNIT(S)/MIN: at 12:03

## 2018-05-17 RX ADMIN — Medication 975 MILLIGRAM(S): at 19:00

## 2018-05-17 RX ADMIN — Medication 208 GRAM(S): at 00:02

## 2018-05-17 RX ADMIN — Medication 1000 MILLIUNIT(S)/MIN: at 12:03

## 2018-05-17 RX ADMIN — Medication 2 MILLIUNIT(S)/MIN: at 01:16

## 2018-05-17 RX ADMIN — Medication 30 MILLIGRAM(S): at 12:03

## 2018-05-17 RX ADMIN — Medication 600 MILLIGRAM(S): at 19:39

## 2018-05-17 RX ADMIN — Medication 30 MILLIGRAM(S): at 12:02

## 2018-05-17 RX ADMIN — SODIUM CHLORIDE 125 MILLILITER(S): 9 INJECTION, SOLUTION INTRAVENOUS at 01:53

## 2018-05-17 RX ADMIN — SODIUM CHLORIDE 2000 MILLILITER(S): 9 INJECTION, SOLUTION INTRAVENOUS at 00:03

## 2018-05-17 NOTE — DISCHARGE NOTE OB - PATIENT PORTAL LINK FT
You can access the [email or web address] Patient Portal, offered by [facility], by registering with the following website: [email or web address]

## 2018-05-17 NOTE — DISCHARGE NOTE OB - CARE PLAN
Principal Discharge DX:	Post term pregnancy at 41 weeks gestation  Goal:	full recovery  Assessment and plan of treatment:	regular diet, normal activity, , follow up in office in 6 weeks.

## 2018-05-17 NOTE — DISCHARGE NOTE OB - MEDICATION SUMMARY - MEDICATIONS TO TAKE
I will START or STAY ON the medications listed below when I get home from the hospital:    ibuprofen 600 mg oral tablet  -- 1 tab(s) by mouth every 6 hours  -- Indication: For Term pregnancy delivered I will START or STAY ON the medications listed below when I get home from the hospital:    ibuprofen 600 mg oral tablet  -- 1 tab(s) by mouth every 6 hours, As Needed  -- Indication: For Pain

## 2018-05-17 NOTE — DISCHARGE NOTE OB - MATERIALS PROVIDED
[facility] [place] Screening Program/Shaken Baby Prevention Handout/Breastfeeding Mother’s Support Group Information/Guide to Postpartum Care/MMR Vaccination (VIS Pub Date: 2012)/Birth Certificate Instructions/Discharge Medication Information for Patients and Families Pocket Guide/[facility] Hearing Screen Program/Tdap Vaccination (VIS Pub Date: 2012)/[place]  Immunization Record

## 2018-05-17 NOTE — DISCHARGE NOTE OB - PROVIDER TOKENS
FREE:[LAST:[Bessy SAUCEDO],FIRST:[Negro],PHONE:[phone]],FAX:[phone]],ADDRESS:[[address] [place] [id number]]]

## 2018-05-17 NOTE — DISCHARGE NOTE OB - HOSPITAL COURSE
32 y.o. P0 admitted 18 at 41 weeks for induction of labor. PNc at [facility]- dr. Doherty , uncomplicated . pt received cytotec, progressed into labor and delivered on 18 , a live female  via   apgars 9/9 wt. 2990 grams. placenta delivered spontaneously and intact. pt sustained a small midline vaginal second degree laceration which was repaired. EBL- 400cc. pt did well and was discharged home on PPD # 2 .

## 2018-05-18 RX ADMIN — Medication 1 TABLET(S): at 12:26

## 2018-05-18 RX ADMIN — Medication 600 MILLIGRAM(S): at 12:26

## 2018-05-18 RX ADMIN — Medication 975 MILLIGRAM(S): at 09:40

## 2018-05-18 RX ADMIN — SODIUM CHLORIDE 3 MILLILITER(S): 9 INJECTION INTRAMUSCULAR; INTRAVENOUS; SUBCUTANEOUS at 02:16

## 2018-05-18 RX ADMIN — Medication 600 MILLIGRAM(S): at 17:46

## 2018-05-18 RX ADMIN — Medication 600 MILLIGRAM(S): at 06:35

## 2018-05-18 RX ADMIN — SODIUM CHLORIDE 3 MILLILITER(S): 9 INJECTION INTRAMUSCULAR; INTRAVENOUS; SUBCUTANEOUS at 10:27

## 2018-05-18 RX ADMIN — Medication 600 MILLIGRAM(S): at 12:55

## 2018-05-18 RX ADMIN — Medication 600 MILLIGRAM(S): at 18:15

## 2018-05-18 RX ADMIN — Medication 600 MILLIGRAM(S): at 06:04

## 2018-05-18 RX ADMIN — Medication 975 MILLIGRAM(S): at 10:10

## 2018-05-18 RX ADMIN — SODIUM CHLORIDE 3 MILLILITER(S): 9 INJECTION INTRAMUSCULAR; INTRAVENOUS; SUBCUTANEOUS at 14:01

## 2018-05-19 VITALS
RESPIRATION RATE: 18 BRPM | HEART RATE: 78 BPM | OXYGEN SATURATION: 98 % | SYSTOLIC BLOOD PRESSURE: 113 MMHG | TEMPERATURE: 98 F | DIASTOLIC BLOOD PRESSURE: 73 MMHG

## 2018-05-19 RX ORDER — TETANUS TOXOID, REDUCED DIPHTHERIA TOXOID AND ACELLULAR PERTUSSIS VACCINE, ADSORBED 5; 2.5; 8; 8; 2.5 [IU]/.5ML; [IU]/.5ML; UG/.5ML; UG/.5ML; UG/.5ML
0.5 SUSPENSION INTRAMUSCULAR ONCE
Qty: 0 | Refills: 0 | Status: COMPLETED | OUTPATIENT
Start: 2018-05-19 | End: 2018-05-19

## 2018-05-19 RX ADMIN — TETANUS TOXOID, REDUCED DIPHTHERIA TOXOID AND ACELLULAR PERTUSSIS VACCINE, ADSORBED 0.5 MILLILITER(S): 5; 2.5; 8; 8; 2.5 SUSPENSION INTRAMUSCULAR at 02:16

## 2018-05-19 RX ADMIN — Medication 600 MILLIGRAM(S): at 09:10

## 2018-05-19 RX ADMIN — Medication 600 MILLIGRAM(S): at 09:40

## 2018-05-19 RX ADMIN — Medication 600 MILLIGRAM(S): at 02:34

## 2018-05-19 RX ADMIN — Medication 600 MILLIGRAM(S): at 03:00

## 2018-05-19 NOTE — PROGRESS NOTE ADULT - PROBLEM SELECTOR PLAN 1
- Continue with po analgesia  - Increase ambulation  - Continue regular diet  - IV lock    Bart Zamudio MD PGY1
- encourage more out of bed and ambulation  - analgesia PRN  - regular diet  - c/w BP checks  - c/w routine pp care  - discharge planned for today    IVELISSE Pierre PGY1

## 2018-05-19 NOTE — PROGRESS NOTE ADULT - SUBJECTIVE AND OBJECTIVE BOX
Patient seen and examined at bedside, no acute overnight events. No acute complaints, pain well controlled. Patient is ambulating, voiding spontaneously, passing gas, and tolerating regular diet.    Vital Signs Last 24 Hours  T(C): 36.6 (05-18-18 @ 05:43), Max: 37 (05-17-18 @ 14:38)  HR: 73 (05-18-18 @ 05:43) (73 - 97)  BP: 135/84 (05-18-18 @ 05:43) (101/58 - 137/68)  RR: 18 (05-18-18 @ 05:43) (18 - 19)  SpO2: 100% (05-18-18 @ 05:43) (99% - 100%)    Physical Exam:  General: NAD  Abdomen: Soft, non-tender, non-distended, fundus firm  Pelvic: Lochia wnl    Labs:    Blood Type: B Positive  Antibody Screen: --  RPR: Negative               10.6   14.50 )-----------( 164      ( 05-17 @ 11:59 )             31.0                11.7   11.18 )-----------( 195      ( 05-17 @ 04:35 )             35.1                11.7   10.36 )-----------( 181      ( 05-16 @ 16:03 )             35.4         MEDICATIONS  (STANDING):  acetaminophen   Tablet. 975 milliGRAM(s) Oral every 6 hours  diphtheria/tetanus/pertussis (acellular) Vaccine (ADAcel) 0.5 milliLiter(s) IntraMuscular once  ibuprofen  Tablet 600 milliGRAM(s) Oral every 6 hours  oxyCODONE    IR 5 milliGRAM(s) Oral every 3 hours  prenatal multivitamin 1 Tablet(s) Oral daily  sodium chloride 0.9% lock flush 3 milliLiter(s) IV Push every 8 hours    MEDICATIONS  (PRN):  dibucaine 1% Ointment 1 Application(s) Topical every 4 hours PRN Perineal Discomfort  diphenhydrAMINE   Capsule 25 milliGRAM(s) Oral every 6 hours PRN Itching  docusate sodium 100 milliGRAM(s) Oral two times a day PRN Stool Softening  glycerin Suppository - Adult 1 Suppository(s) Rectal at bedtime PRN Constipation  hydrocortisone 1% Cream 1 Application(s) Topical every 4 hours PRN perineal discomfort  lanolin Ointment 1 Application(s) Topical every 6 hours PRN Sore Nipples  magnesium hydroxide Suspension 30 milliLiter(s) Oral two times a day PRN Constipation  oxyCODONE    IR 5 milliGRAM(s) Oral every 4 hours PRN Severe Pain (7 -10)  simethicone 80 milliGRAM(s) Chew every 6 hours PRN Gas  witch hazel Pads 1 Application(s) Topical every 4 hours PRN Perineal Discomfort
32y old w PEC post partum day 2    Patient seen and examined at bedside, no acute overnight events. No acute complaints, pain well controlled.  Patient is ambulating, voiding spontaneously, passing gas, and tolerating regular diet.     Denies HA, vision changes, RUQ/epigastric pain, nausea, vomiting, SOB, CP.    Vital Signs Last 24 Hours  T(C): 36.9 (05-19-18 @ 04:54), Max: 37.2 (05-18-18 @ 17:32)  HR: 78 (05-19-18 @ 04:54) (78 - 93)  BP: 113/73 (05-19-18 @ 04:54) (113/73 - 117/78)  RR: 18 (05-19-18 @ 04:54) (18 - 19)  SpO2: 98% (05-19-18 @ 04:54) (98% - 100%)    Physical Exam:  General: NAD  Abdomen: Soft, non-tender, non-distended, fundus firm  Pelvic: Lochia wnl  Ext: NTBL  Labs:  Blood type: B Positive  Rubella IgG: RPR: Negative                          10.6<L>   14.50<H> >-----------< 164    ( 05-17 @ 11:59 )             31.0<L>                        11.7   11.18<H> >-----------< 195    ( 05-17 @ 04:35 )             35.1                        11.7   10.36 >-----------< 181    ( 05-16 @ 16:03 )             35.4    05-17-18 @ 11:59      132<L>  |  103  |  11  ----------------------------<  96  3.6   |  22  |  0.72    05-17-18 @ 04:35      135  |  102  |  12  ----------------------------<  79  4.4   |  23  |  0.77    05-16-18 @ 16:03      135  |  102  |  18  ----------------------------<  80  3.9   |  19<L>  |  0.67        Ca    8.4      17 May 2018 11:59  Ca    8.9      17 May 2018 04:35  Ca    8.9      16 May 2018 16:03    TPro  5.3<L>  /  Alb  2.9<L>  /  TBili  0.5  /  DBili  x   /  AST  21  /  ALT  12  /  AlkPhos  100  05-17-18 @ 11:59  TPro  6.2  /  Alb  3.3  /  TBili  0.4  /  DBili  x   /  AST  19  /  ALT  17  /  AlkPhos  114  05-17-18 @ 04:35  TPro  6.4  /  Alb  3.5  /  TBili  < 0.2<L>  /  DBili  x   /  AST  24  /  ALT  16  /  AlkPhos  121<H>  05-16-18 @ 16:03          MEDICATIONS  (STANDING):  acetaminophen   Tablet. 975 milliGRAM(s) Oral every 6 hours  ibuprofen  Tablet 600 milliGRAM(s) Oral every 6 hours  oxyCODONE    IR 5 milliGRAM(s) Oral every 3 hours  prenatal multivitamin 1 Tablet(s) Oral daily  sodium chloride 0.9% lock flush 3 milliLiter(s) IV Push every 8 hours    MEDICATIONS  (PRN):  dibucaine 1% Ointment 1 Application(s) Topical every 4 hours PRN Perineal Discomfort  diphenhydrAMINE   Capsule 25 milliGRAM(s) Oral every 6 hours PRN Itching  docusate sodium 100 milliGRAM(s) Oral two times a day PRN Stool Softening  glycerin Suppository - Adult 1 Suppository(s) Rectal at bedtime PRN Constipation  hydrocortisone 1% Cream 1 Application(s) Topical every 4 hours PRN perineal discomfort  lanolin Ointment 1 Application(s) Topical every 6 hours PRN Sore Nipples  magnesium hydroxide Suspension 30 milliLiter(s) Oral two times a day PRN Constipation  oxyCODONE    IR 5 milliGRAM(s) Oral every 4 hours PRN Severe Pain (7 -10)  simethicone 80 milliGRAM(s) Chew every 6 hours PRN Gas  witch hazel Pads 1 Application(s) Topical every 4 hours PRN Perineal Discomfort
Anesthesia Post-op Note    Patient s/p  with epidural yesterday.     No residual anesthetic issues or complications noted.

## 2018-06-13 DIAGNOSIS — O35.8XX0 MATERNAL CARE FOR OTHER (SUSPECTED) FETAL ABNORMALITY AND DAMAGE, NOT APPLICABLE OR UNSPECIFIED: ICD-10-CM

## 2018-06-13 DIAGNOSIS — O34.43 MATERNAL CARE FOR OTHER ABNORMALITIES OF CERVIX, THIRD TRIMESTER: ICD-10-CM

## 2018-06-13 DIAGNOSIS — Z3A.41 41 WEEKS GESTATION OF PREGNANCY: ICD-10-CM

## 2018-06-13 DIAGNOSIS — O48.0 POST-TERM PREGNANCY: ICD-10-CM

## 2018-06-13 DIAGNOSIS — O99.89 OTHER SPECIFIED DISEASES AND CONDITIONS COMPLICATING PREGNANCY, CHILDBIRTH AND THE PUERPERIUM: ICD-10-CM

## 2018-06-26 ENCOUNTER — APPOINTMENT (OUTPATIENT)
Dept: OBGYN | Facility: CLINIC | Age: 33
End: 2018-06-26
Payer: COMMERCIAL

## 2018-06-26 VITALS
SYSTOLIC BLOOD PRESSURE: 103 MMHG | DIASTOLIC BLOOD PRESSURE: 74 MMHG | HEART RATE: 80 BPM | HEIGHT: 64 IN | WEIGHT: 184 LBS | BODY MASS INDEX: 31.41 KG/M2

## 2018-06-26 PROCEDURE — 0503F POSTPARTUM CARE VISIT: CPT

## 2020-01-10 ENCOUNTER — APPOINTMENT (OUTPATIENT)
Dept: OBGYN | Facility: CLINIC | Age: 35
End: 2020-01-10
Payer: COMMERCIAL

## 2020-01-10 VITALS
HEIGHT: 64 IN | DIASTOLIC BLOOD PRESSURE: 70 MMHG | BODY MASS INDEX: 27.83 KG/M2 | WEIGHT: 163 LBS | HEART RATE: 88 BPM | SYSTOLIC BLOOD PRESSURE: 101 MMHG

## 2020-01-10 DIAGNOSIS — N81.4 UTEROVAGINAL PROLAPSE, UNSPECIFIED: ICD-10-CM

## 2020-01-10 DIAGNOSIS — R10.2 PELVIC AND PERINEAL PAIN: ICD-10-CM

## 2020-01-10 PROCEDURE — 99213 OFFICE O/P EST LOW 20 MIN: CPT

## 2020-01-10 NOTE — CHIEF COMPLAINT
[Follow Up] : follow up GYN visit [FreeTextEntry1] : pt presents for follow up, LMP 12/30/19, ( 2 weeks early) . today, pt is c/o pelvic pressure  and pulling, and felt a bulge in the vulvar area  upon using the bathroom. . pt also feels this 'bulge' when she is sitting for a while, and also reports that now , a tampon won't stay in when placed. pt also reports dyspareunia.

## 2020-01-13 LAB
BACTERIA UR CULT: NORMAL
C TRACH RRNA SPEC QL NAA+PROBE: NOT DETECTED
CANDIDA VAG CYTO: DETECTED
G VAGINALIS+PREV SP MTYP VAG QL MICRO: NOT DETECTED
N GONORRHOEA RRNA SPEC QL NAA+PROBE: NOT DETECTED
SOURCE AMPLIFICATION: NORMAL
T VAGINALIS VAG QL WET PREP: NOT DETECTED

## 2020-01-16 ENCOUNTER — APPOINTMENT (OUTPATIENT)
Dept: OBGYN | Facility: CLINIC | Age: 35
End: 2020-01-16
Payer: COMMERCIAL

## 2020-01-16 ENCOUNTER — ASOB RESULT (OUTPATIENT)
Age: 35
End: 2020-01-16

## 2020-01-16 PROCEDURE — 76830 TRANSVAGINAL US NON-OB: CPT

## 2021-11-09 ENCOUNTER — ASOB RESULT (OUTPATIENT)
Age: 36
End: 2021-11-09

## 2021-11-09 ENCOUNTER — APPOINTMENT (OUTPATIENT)
Dept: OBGYN | Facility: CLINIC | Age: 36
End: 2021-11-09
Payer: COMMERCIAL

## 2021-11-09 VITALS
HEIGHT: 64 IN | HEART RATE: 81 BPM | DIASTOLIC BLOOD PRESSURE: 75 MMHG | SYSTOLIC BLOOD PRESSURE: 114 MMHG | BODY MASS INDEX: 29.19 KG/M2 | WEIGHT: 171 LBS

## 2021-11-09 DIAGNOSIS — Z87.59 PERSONAL HISTORY OF OTHER COMPLICATIONS OF PREGNANCY, CHILDBIRTH AND THE PUERPERIUM: ICD-10-CM

## 2021-11-09 PROCEDURE — 76817 TRANSVAGINAL US OBSTETRIC: CPT

## 2021-11-09 PROCEDURE — 99213 OFFICE O/P EST LOW 20 MIN: CPT

## 2021-11-09 RX ORDER — DEXTROAMPHETAMINE SACCHARATE, AMPHETAMINE ASPARTATE, DEXTROAMPHETAMINE SULFATE AND AMPHETAMINE SULFATE 2.5; 2.5; 2.5; 2.5 MG/1; MG/1; MG/1; MG/1
10 TABLET ORAL
Qty: 90 | Refills: 0 | Status: DISCONTINUED | COMMUNITY
Start: 2017-08-23 | End: 2021-11-09

## 2021-11-09 RX ORDER — VITAMIN A ACETATE, .BETA.-CAROTENE, ASCORBIC ACID, CHOLECALCIFEROL, .ALPHA.-TOCOPHEROL ACETATE, DL-, THIAMINE MONONITRATE, RIBOFLAVIN, NIACINAMIDE, PYRIDOXINE HYDROCHLORIDE, FOLIC ACID, CYANOCOBALAMIN, CALCIUM CARBONATE, FERROUS FUMARATE, ZINC OXIDE, AND CUPRIC OXIDE 2000; 2000; 120; 400; 22; 1.84; 3; 20; 10; 1; 12; 200; 27; 25; 2 [IU]/1; [IU]/1; MG/1; [IU]/1; MG/1; MG/1; MG/1; MG/1; MG/1; MG/1; UG/1; MG/1; MG/1; MG/1; MG/1
27-1 TABLET ORAL
Qty: 30 | Refills: 0 | Status: DISCONTINUED | COMMUNITY
Start: 2017-09-22 | End: 2021-11-09

## 2021-11-09 RX ORDER — NITROFURANTOIN (MONOHYDRATE/MACROCRYSTALS) 25; 75 MG/1; MG/1
100 CAPSULE ORAL TWICE DAILY
Qty: 14 | Refills: 0 | Status: DISCONTINUED | COMMUNITY
Start: 2017-10-26 | End: 2021-11-09

## 2021-11-09 RX ORDER — VITAMIN A, ASCORBIC ACID, CHOLECALCIFEROL, .ALPHA.-TOCOPHEROL ACETATE, DL-, THIAMINE MONONITRATE, RIBOFLAVIN, NIACINAMIDE, PYRIDOXINE HYDROCHLORIDE, FOLIC ACID, CYANOCOBALAMIN, CALCIUM CARBONATE, IRON, ZINC OXIDE, AND CUPRIC OXIDE 4000; 120; 400; 22; 1.84; 3; 20; 10; 1; 12; 200; 29; 25; 2 [IU]/1; MG/1; [IU]/1; [IU]/1; MG/1; MG/1; MG/1; MG/1; MG/1; UG/1; MG/1; MG/1; MG/1; MG/1
29-1 TABLET ORAL DAILY
Qty: 90 | Refills: 2 | Status: DISCONTINUED | COMMUNITY
Start: 2017-09-19 | End: 2021-11-09

## 2021-11-09 NOTE — DISCUSSION/SUMMARY
[FreeTextEntry1] : A Di/Di twins at 6 weeks 2 days\par      AmA\par     hx of GHTN\par     cervical polyp, s/p LEEP\par     Anxiety\par    Fibromyalgia\par    \par \par P_ f/u 1 month\par      pap and initial labs done\par      EDC 7/4/22, P 1001\par     PNV sent to pharmacy\par       letter and brochures given \par     reviewed concerns with Twins ( GHTN, DM, growth discordance, congentital abnormalities, pTL etc.). \par   NIPs and NT next visit.

## 2021-11-09 NOTE — PHYSICAL EXAM
[Appropriately responsive] : appropriately responsive [Alert] : alert [No Acute Distress] : no acute distress [No Lymphadenopathy] : no lymphadenopathy [Regular Rate Rhythm] : regular rate rhythm [No Murmurs] : no murmurs [Clear to Auscultation B/L] : clear to auscultation bilaterally [Soft] : soft [Non-tender] : non-tender [Non-distended] : non-distended [No HSM] : No HSM [No Lesions] : no lesions [No Mass] : no mass [Oriented x3] : oriented x3 [Examination Of The Breasts] : a normal appearance [Breast Reconstruction Right] : breast reconstruction [Breast Reconstruction Left] : breast reconstruction [No Masses] : no breast masses were palpable [Labia Majora] : normal [Labia Minora] : normal [Normal] : normal [Polyp ___ cm] : [unfilled] ~[facility] polyp [Retroversion] : retroverted [Uterine Adnexae] : normal

## 2021-11-09 NOTE — HISTORY OF PRESENT ILLNESS
[FreeTextEntry1] : 36 y.o. P 1001 LMP 21 presents for annual exam and confirmation of pregnancy. , pt feels well. pt has hx  of fibromyalgia, , and anxiety, pt has surgical history of laparoscopic removal of dermoid from right ovary, reduction mammoplasty  and LEEP. . new family hx is bladder ca - father, both parents have Type 2 Diabetes  Thyroid ca- mother,  pt has hx of GHTN with last pregnancy pt is s/p . with manual placental removal

## 2021-11-12 LAB
ABO + RH PNL BLD: NORMAL
B19V IGG SER QL IA: 6.52 INDEX
B19V IGG+IGM SER-IMP: NORMAL
B19V IGG+IGM SER-IMP: POSITIVE
B19V IGM FLD-ACNC: 0.24 INDEX
B19V IGM SER-ACNC: NEGATIVE
BACTERIA UR CULT: NORMAL
BASOPHILS # BLD AUTO: 0.03 K/UL
BASOPHILS NFR BLD AUTO: 0.4 %
BLD GP AB SCN SERPL QL: NORMAL
COVID-19 SPIKE DOMAIN ANTIBODY INTERPRETATION: POSITIVE
EOSINOPHIL # BLD AUTO: 0.15 K/UL
EOSINOPHIL NFR BLD AUTO: 2.1 %
HBV SURFACE AG SER QL: NONREACTIVE
HCT VFR BLD CALC: 42.7 %
HCV AB SER QL: NONREACTIVE
HCV S/CO RATIO: 0.13 S/CO
HGB A MFR BLD: 96.1 %
HGB A2 MFR BLD: 2.8 %
HGB BLD-MCNC: 13.4 G/DL
HGB F MFR BLD: 1.1 %
HGB FRACT BLD-IMP: NORMAL
HIV1+2 AB SPEC QL IA.RAPID: NONREACTIVE
HPV HIGH+LOW RISK DNA PNL CVX: NOT DETECTED
IMM GRANULOCYTES NFR BLD AUTO: 0.3 %
LYMPHOCYTES # BLD AUTO: 1.74 K/UL
LYMPHOCYTES NFR BLD AUTO: 24.8 %
MAN DIFF?: NORMAL
MCHC RBC-ENTMCNC: 31.4 GM/DL
MCHC RBC-ENTMCNC: 32.2 PG
MCV RBC AUTO: 102.6 FL
MEV IGG FLD QL IA: >300 AU/ML
MEV IGG+IGM SER-IMP: POSITIVE
MONOCYTES # BLD AUTO: 0.54 K/UL
MONOCYTES NFR BLD AUTO: 7.7 %
NEUTROPHILS # BLD AUTO: 4.55 K/UL
NEUTROPHILS NFR BLD AUTO: 64.7 %
PLATELET # BLD AUTO: 246 K/UL
RBC # BLD: 4.16 M/UL
RBC # FLD: 12.7 %
RUBV IGG FLD-ACNC: 1.8 INDEX
RUBV IGG SER-IMP: POSITIVE
SARS-COV-2 AB SERPL IA-ACNC: >250 U/ML
T PALLIDUM AB SER QL IA: NEGATIVE
VZV AB TITR SER: POSITIVE
VZV IGG SER IF-ACNC: 795.1 INDEX
WBC # FLD AUTO: 7.03 K/UL

## 2021-11-15 LAB — CYTOLOGY CVX/VAG DOC THIN PREP: NORMAL

## 2021-11-16 LAB
CFTR MUT TESTED BLD/T: NEGATIVE
FMR1 GENE MUT ANL BLD/T: NORMAL

## 2021-11-17 LAB — AR GENE MUT ANL BLD/T: NORMAL

## 2021-12-21 ENCOUNTER — LABORATORY RESULT (OUTPATIENT)
Age: 36
End: 2021-12-21

## 2021-12-22 ENCOUNTER — APPOINTMENT (OUTPATIENT)
Dept: OBGYN | Facility: CLINIC | Age: 36
End: 2021-12-22
Payer: COMMERCIAL

## 2021-12-22 ENCOUNTER — ASOB RESULT (OUTPATIENT)
Age: 36
End: 2021-12-22

## 2021-12-22 ENCOUNTER — APPOINTMENT (OUTPATIENT)
Dept: ANTEPARTUM | Facility: CLINIC | Age: 36
End: 2021-12-22
Payer: COMMERCIAL

## 2021-12-22 VITALS
WEIGHT: 182 LBS | HEIGHT: 64 IN | BODY MASS INDEX: 31.07 KG/M2 | SYSTOLIC BLOOD PRESSURE: 136 MMHG | DIASTOLIC BLOOD PRESSURE: 81 MMHG | HEART RATE: 97 BPM | TEMPERATURE: 97.3 F

## 2021-12-22 VITALS — DIASTOLIC BLOOD PRESSURE: 86 MMHG | SYSTOLIC BLOOD PRESSURE: 123 MMHG

## 2021-12-22 PROCEDURE — 0501F PRENATAL FLOW SHEET: CPT

## 2021-12-22 PROCEDURE — 76801 OB US < 14 WKS SINGLE FETUS: CPT

## 2021-12-22 PROCEDURE — 76813 OB US NUCHAL MEAS 1 GEST: CPT

## 2021-12-22 PROCEDURE — 76802 OB US < 14 WKS ADDL FETUS: CPT

## 2021-12-22 PROCEDURE — 36416 COLLJ CAPILLARY BLOOD SPEC: CPT

## 2021-12-22 PROCEDURE — 76814 OB US NUCHAL MEAS ADD-ON: CPT

## 2022-01-12 ENCOUNTER — NON-APPOINTMENT (OUTPATIENT)
Age: 37
End: 2022-01-12

## 2022-01-26 ENCOUNTER — APPOINTMENT (OUTPATIENT)
Dept: ANTEPARTUM | Facility: CLINIC | Age: 37
End: 2022-01-26
Payer: COMMERCIAL

## 2022-01-26 ENCOUNTER — APPOINTMENT (OUTPATIENT)
Dept: OBGYN | Facility: CLINIC | Age: 37
End: 2022-01-26
Payer: COMMERCIAL

## 2022-01-26 ENCOUNTER — ASOB RESULT (OUTPATIENT)
Age: 37
End: 2022-01-26

## 2022-01-26 VITALS
HEIGHT: 64 IN | SYSTOLIC BLOOD PRESSURE: 108 MMHG | TEMPERATURE: 97.3 F | DIASTOLIC BLOOD PRESSURE: 71 MMHG | BODY MASS INDEX: 33.63 KG/M2 | HEART RATE: 86 BPM | WEIGHT: 197 LBS

## 2022-01-26 PROCEDURE — 0502F SUBSEQUENT PRENATAL CARE: CPT

## 2022-01-26 PROCEDURE — 76815 OB US LIMITED FETUS(S): CPT

## 2022-01-26 PROCEDURE — 76817 TRANSVAGINAL US OBSTETRIC: CPT

## 2022-01-28 LAB
2ND TRIMESTER DATA: NORMAL
ADDENDUM DOC: NORMAL
AFP PNL SERPL: NORMAL
AFP SERPL-ACNC: NORMAL
CLINICAL BIOCHEMIST REVIEW: NORMAL
NOTES NTD: NORMAL

## 2022-02-17 ENCOUNTER — ASOB RESULT (OUTPATIENT)
Age: 37
End: 2022-02-17

## 2022-02-17 ENCOUNTER — APPOINTMENT (OUTPATIENT)
Dept: ANTEPARTUM | Facility: CLINIC | Age: 37
End: 2022-02-17
Payer: COMMERCIAL

## 2022-02-17 PROCEDURE — 76817 TRANSVAGINAL US OBSTETRIC: CPT

## 2022-02-17 PROCEDURE — 76812 OB US DETAILED ADDL FETUS: CPT | Mod: 59

## 2022-02-17 PROCEDURE — 76811 OB US DETAILED SNGL FETUS: CPT

## 2022-02-17 PROCEDURE — 99202 OFFICE O/P NEW SF 15 MIN: CPT | Mod: 25

## 2022-03-01 ENCOUNTER — APPOINTMENT (OUTPATIENT)
Dept: OBGYN | Facility: CLINIC | Age: 37
End: 2022-03-01
Payer: COMMERCIAL

## 2022-03-01 VITALS
BODY MASS INDEX: 35.08 KG/M2 | SYSTOLIC BLOOD PRESSURE: 116 MMHG | WEIGHT: 205.5 LBS | DIASTOLIC BLOOD PRESSURE: 77 MMHG | HEART RATE: 102 BPM | HEIGHT: 64 IN

## 2022-03-01 PROCEDURE — 0502F SUBSEQUENT PRENATAL CARE: CPT

## 2022-03-03 ENCOUNTER — ASOB RESULT (OUTPATIENT)
Age: 37
End: 2022-03-03

## 2022-03-03 ENCOUNTER — APPOINTMENT (OUTPATIENT)
Dept: ANTEPARTUM | Facility: CLINIC | Age: 37
End: 2022-03-03
Payer: COMMERCIAL

## 2022-03-03 PROCEDURE — 76816 OB US FOLLOW-UP PER FETUS: CPT

## 2022-03-17 ENCOUNTER — APPOINTMENT (OUTPATIENT)
Dept: ANTEPARTUM | Facility: CLINIC | Age: 37
End: 2022-03-17
Payer: COMMERCIAL

## 2022-03-17 ENCOUNTER — ASOB RESULT (OUTPATIENT)
Age: 37
End: 2022-03-17

## 2022-03-17 PROCEDURE — 76816 OB US FOLLOW-UP PER FETUS: CPT | Mod: 59

## 2022-03-17 PROCEDURE — 99212 OFFICE O/P EST SF 10 MIN: CPT | Mod: 25

## 2022-03-29 ENCOUNTER — TRANSCRIPTION ENCOUNTER (OUTPATIENT)
Age: 37
End: 2022-03-29

## 2022-03-29 ENCOUNTER — NON-APPOINTMENT (OUTPATIENT)
Age: 37
End: 2022-03-29

## 2022-03-30 ENCOUNTER — APPOINTMENT (OUTPATIENT)
Dept: OBGYN | Facility: CLINIC | Age: 37
End: 2022-03-30

## 2022-03-30 ENCOUNTER — OUTPATIENT (OUTPATIENT)
Dept: OUTPATIENT SERVICES | Facility: HOSPITAL | Age: 37
LOS: 1 days | End: 2022-03-30

## 2022-03-30 ENCOUNTER — APPOINTMENT (OUTPATIENT)
Dept: DISASTER EMERGENCY | Facility: HOSPITAL | Age: 37
End: 2022-03-30

## 2022-03-30 VITALS — RESPIRATION RATE: 20 BRPM | HEART RATE: 85 BPM | TEMPERATURE: 98 F | HEIGHT: 64 IN | WEIGHT: 158.07 LBS

## 2022-03-30 VITALS
HEART RATE: 85 BPM | SYSTOLIC BLOOD PRESSURE: 116 MMHG | DIASTOLIC BLOOD PRESSURE: 84 MMHG | RESPIRATION RATE: 18 BRPM | TEMPERATURE: 99 F

## 2022-03-30 DIAGNOSIS — U07.1 COVID-19: ICD-10-CM

## 2022-03-30 RX ORDER — BEBTELOVIMAB 87.5 MG/ML
175 INJECTION, SOLUTION INTRAVENOUS ONCE
Refills: 0 | Status: COMPLETED | OUTPATIENT
Start: 2022-03-30 | End: 2022-03-30

## 2022-03-30 RX ORDER — SODIUM CHLORIDE 9 MG/ML
250 INJECTION INTRAMUSCULAR; INTRAVENOUS; SUBCUTANEOUS
Refills: 0 | Status: DISCONTINUED | OUTPATIENT
Start: 2022-03-30 | End: 2022-04-13

## 2022-03-30 RX ADMIN — BEBTELOVIMAB 175 MILLIGRAM(S): 87.5 INJECTION, SOLUTION INTRAVENOUS at 14:10

## 2022-03-30 RX ADMIN — SODIUM CHLORIDE 50 MILLILITER(S): 9 INJECTION INTRAMUSCULAR; INTRAVENOUS; SUBCUTANEOUS at 14:41

## 2022-03-30 NOTE — MONOCLONAL ANTIBODY INFUSION - ASSESSMENT AND PLAN
37 y/o pregnant (due date 7/4/22) F with no sign PMHx and recently diagnosed with Covid-19 infection who was referred for monoclonal antibody infusion by Negro Hwang after testing positive for COVID 19 on 3/28/22.  Patient states she has been experiencing fever, sore throat and cough since 3/27/22. She denies any CP, SOB, chills, numbness/tingling in b/l limbs, loss of sensation or motor function, N/V/D        PLAN:  - Injection procedure explained to patient   - Consent for monoclonal antibody injection obtained   - Risk & benefits discussed/all questions answered  - Inject Bebtelovimab 175 mg over 1 minutes   - Observe patient for one hour post administration    I have reviewed the Bebtelovimab Emergency Use Authorization (EUA) and I have provided the patient or patient's caregiver with the following information:    1. FDA has authorized emergency use Bebtelovimab, which is not an FDA-approved biological product.  2. The patient or patient's caregiver has the option to accept or refuse administration of Bebtelovimab.  3. The significant known and potential risks and benefits of Bebtelovimab and the extent to which such risks and benefits are unknown.  4. Information on available alternative treatments and risks and benefits of those alternatives.    The patient's COVID monoclonal antibody injection administration went well without any complications. The patient tolerated the treatment without any reactions. Vitals were stable throughout the injection & post-injection administration. The pt denies any CP, fevers, chills, SOB, numbness/tingling in b/l limbs, loss of sensation or motor function, N/V/D while receiving the injection. Patient denies any symptoms an hour after post injection. Vitals were taken post injection and were stable. Pt is medically stable to be discharged home. Discharge instructions were provided to the patient with a fact sheet included. Patient was instructed to self-isolate and use infection control measures (e.g wear mask, isolate, social distance, avoid sharing personal items, clean and disinfect "high touch" surfaces, and frequent handwashing according to the CDC guidelines. The patient was informed on what symptoms to be aware of for the next couple of days, and if there are any issues to call the 24/7 clinical call center. Patient was instructed to follow up with PCP as needed.

## 2022-03-30 NOTE — MONOCLONAL ANTIBODY INFUSION - EXAM
CC: Monoclonal Antibody Infusion/COVID 19 Positive  36yFemale    exam/findings:  T(C): 36.9 (03-30-22 @ 14:04), Max: 36.9 (03-30-22 @ 14:04)  HR: 85 (03-30-22 @ 14:04) (85 - 85)  BP: --  RR: 20 (03-30-22 @ 14:04) (20 - 20)  SpO2: --      PE:   Appearance: NAD	  HEENT:   Normal oral mucosa,   Lymphatic: No lymphadenopathy  Cardiovascular: Normal S1 S2, No JVD, No murmurs, No edema  Respiratory: Lungs clear to auscultation	  Gastrointestinal:  Soft, Non-tender, + BS	  Skin: warm and dry  Neurologic: Non-focal  Extremities: Normal range of motion,

## 2022-03-31 ENCOUNTER — APPOINTMENT (OUTPATIENT)
Dept: ANTEPARTUM | Facility: CLINIC | Age: 37
End: 2022-03-31

## 2022-04-12 ENCOUNTER — APPOINTMENT (OUTPATIENT)
Dept: OBGYN | Facility: CLINIC | Age: 37
End: 2022-04-12
Payer: COMMERCIAL

## 2022-04-12 VITALS
DIASTOLIC BLOOD PRESSURE: 81 MMHG | HEART RATE: 99 BPM | SYSTOLIC BLOOD PRESSURE: 119 MMHG | WEIGHT: 219.6 LBS | HEIGHT: 64 IN | BODY MASS INDEX: 37.49 KG/M2

## 2022-04-12 PROCEDURE — 0502F SUBSEQUENT PRENATAL CARE: CPT

## 2022-04-13 LAB
BASOPHILS # BLD AUTO: 0.02 K/UL
BASOPHILS NFR BLD AUTO: 0.2 %
COVID-19 NUCLEOCAPSID  GAM ANTIBODY INTERPRETATION: NEGATIVE
COVID-19 SPIKE DOMAIN ANTIBODY INTERPRETATION: POSITIVE
EOSINOPHIL # BLD AUTO: 0.15 K/UL
EOSINOPHIL NFR BLD AUTO: 1.4 %
GLUCOSE 1H P 50 G GLC PO SERPL-MCNC: 123 MG/DL
HCT VFR BLD CALC: 33.4 %
HGB BLD-MCNC: 10.7 G/DL
HIV1+2 AB SPEC QL IA.RAPID: NONREACTIVE
IMM GRANULOCYTES NFR BLD AUTO: 0.5 %
LYMPHOCYTES # BLD AUTO: 1.65 K/UL
LYMPHOCYTES NFR BLD AUTO: 15.9 %
MAN DIFF?: NORMAL
MCHC RBC-ENTMCNC: 32 GM/DL
MCHC RBC-ENTMCNC: 32.7 PG
MCV RBC AUTO: 102.1 FL
MONOCYTES # BLD AUTO: 0.67 K/UL
MONOCYTES NFR BLD AUTO: 6.5 %
NEUTROPHILS # BLD AUTO: 7.82 K/UL
NEUTROPHILS NFR BLD AUTO: 75.5 %
PLATELET # BLD AUTO: 241 K/UL
RBC # BLD: 3.27 M/UL
RBC # FLD: 14 %
SARS-COV-2 AB SERPL IA-ACNC: >250 U/ML
SARS-COV-2 AB SERPL QL IA: 0.64 INDEX
T PALLIDUM AB SER QL IA: NEGATIVE
WBC # FLD AUTO: 10.36 K/UL

## 2022-04-19 ENCOUNTER — APPOINTMENT (OUTPATIENT)
Dept: ANTEPARTUM | Facility: CLINIC | Age: 37
End: 2022-04-19
Payer: COMMERCIAL

## 2022-04-19 ENCOUNTER — ASOB RESULT (OUTPATIENT)
Age: 37
End: 2022-04-19

## 2022-04-19 ENCOUNTER — NON-APPOINTMENT (OUTPATIENT)
Age: 37
End: 2022-04-19

## 2022-04-19 PROCEDURE — 76816 OB US FOLLOW-UP PER FETUS: CPT | Mod: 59

## 2022-04-19 PROCEDURE — 76820 UMBILICAL ARTERY ECHO: CPT | Mod: 59

## 2022-04-19 PROCEDURE — 76819 FETAL BIOPHYS PROFIL W/O NST: CPT

## 2022-04-19 PROCEDURE — 99212 OFFICE O/P EST SF 10 MIN: CPT | Mod: 25

## 2022-04-27 ENCOUNTER — ASOB RESULT (OUTPATIENT)
Age: 37
End: 2022-04-27

## 2022-04-27 ENCOUNTER — APPOINTMENT (OUTPATIENT)
Dept: ANTEPARTUM | Facility: CLINIC | Age: 37
End: 2022-04-27
Payer: COMMERCIAL

## 2022-04-27 PROCEDURE — 76820 UMBILICAL ARTERY ECHO: CPT | Mod: 59

## 2022-04-27 PROCEDURE — 76819 FETAL BIOPHYS PROFIL W/O NST: CPT

## 2022-04-29 ENCOUNTER — APPOINTMENT (OUTPATIENT)
Dept: OBGYN | Facility: CLINIC | Age: 37
End: 2022-04-29
Payer: COMMERCIAL

## 2022-04-29 VITALS
DIASTOLIC BLOOD PRESSURE: 94 MMHG | BODY MASS INDEX: 37.9 KG/M2 | HEART RATE: 106 BPM | WEIGHT: 222 LBS | SYSTOLIC BLOOD PRESSURE: 139 MMHG | HEIGHT: 64 IN

## 2022-04-29 PROCEDURE — 0502F SUBSEQUENT PRENATAL CARE: CPT

## 2022-05-03 LAB
ALBUMIN SERPL ELPH-MCNC: 3.4 G/DL
ALP BLD-CCNC: 142 U/L
ALT SERPL-CCNC: 7 U/L
ANION GAP SERPL CALC-SCNC: 12 MMOL/L
AST SERPL-CCNC: 14 U/L
BASOPHILS # BLD AUTO: 0.02 K/UL
BASOPHILS NFR BLD AUTO: 0.2 %
BILIRUB SERPL-MCNC: 0.3 MG/DL
BUN SERPL-MCNC: 11 MG/DL
CALCIUM SERPL-MCNC: 9 MG/DL
CHLORIDE SERPL-SCNC: 103 MMOL/L
CO2 SERPL-SCNC: 19 MMOL/L
CREAT SERPL-MCNC: 0.7 MG/DL
EGFR: 115 ML/MIN/1.73M2
EOSINOPHIL # BLD AUTO: 0.14 K/UL
EOSINOPHIL NFR BLD AUTO: 1.5 %
GLUCOSE SERPL-MCNC: 104 MG/DL
HCT VFR BLD CALC: 31.9 %
HGB BLD-MCNC: 10.6 G/DL
IMM GRANULOCYTES NFR BLD AUTO: 0.6 %
LYMPHOCYTES # BLD AUTO: 1.53 K/UL
LYMPHOCYTES NFR BLD AUTO: 16.9 %
MAN DIFF?: NORMAL
MCHC RBC-ENTMCNC: 33.2 GM/DL
MCHC RBC-ENTMCNC: 33.4 PG
MCV RBC AUTO: 100.6 FL
MONOCYTES # BLD AUTO: 0.69 K/UL
MONOCYTES NFR BLD AUTO: 7.6 %
NEUTROPHILS # BLD AUTO: 6.63 K/UL
NEUTROPHILS NFR BLD AUTO: 73.2 %
PLATELET # BLD AUTO: 194 K/UL
POTASSIUM SERPL-SCNC: 4.2 MMOL/L
PROT SERPL-MCNC: 5.9 G/DL
RBC # BLD: 3.17 M/UL
RBC # FLD: 13.7 %
SODIUM SERPL-SCNC: 134 MMOL/L
URATE SERPL-MCNC: 4.6 MG/DL
WBC # FLD AUTO: 9.06 K/UL

## 2022-05-04 ENCOUNTER — ASOB RESULT (OUTPATIENT)
Age: 37
End: 2022-05-04

## 2022-05-04 ENCOUNTER — APPOINTMENT (OUTPATIENT)
Dept: ANTEPARTUM | Facility: CLINIC | Age: 37
End: 2022-05-04
Payer: COMMERCIAL

## 2022-05-04 PROCEDURE — 76820 UMBILICAL ARTERY ECHO: CPT

## 2022-05-04 PROCEDURE — 76819 FETAL BIOPHYS PROFIL W/O NST: CPT | Mod: 59

## 2022-05-07 LAB
CREAT 24H UR-MCNC: 1.6 G/24 H
CREAT ?TM UR-MCNC: 75 MG/DL
PROT 24H UR-MRATE: 17 MG/DL
PROT ?TM UR-MCNC: 24 HR
PROT UR-MCNC: 370 MG/24 H
SPECIMEN VOL 24H UR: 2175 ML

## 2022-05-12 ENCOUNTER — OUTPATIENT (OUTPATIENT)
Dept: OUTPATIENT SERVICES | Facility: HOSPITAL | Age: 37
LOS: 1 days | Discharge: HOME | End: 2022-05-12
Payer: COMMERCIAL

## 2022-05-12 ENCOUNTER — ASOB RESULT (OUTPATIENT)
Age: 37
End: 2022-05-12

## 2022-05-12 ENCOUNTER — APPOINTMENT (OUTPATIENT)
Dept: ANTEPARTUM | Facility: CLINIC | Age: 37
End: 2022-05-12
Payer: COMMERCIAL

## 2022-05-12 VITALS
HEART RATE: 88 BPM | SYSTOLIC BLOOD PRESSURE: 130 MMHG | TEMPERATURE: 98 F | RESPIRATION RATE: 16 BRPM | DIASTOLIC BLOOD PRESSURE: 82 MMHG

## 2022-05-12 VITALS — SYSTOLIC BLOOD PRESSURE: 126 MMHG | DIASTOLIC BLOOD PRESSURE: 78 MMHG | HEART RATE: 75 BPM

## 2022-05-12 DIAGNOSIS — Z3A.00 WEEKS OF GESTATION OF PREGNANCY NOT SPECIFIED: ICD-10-CM

## 2022-05-12 DIAGNOSIS — O26.899 OTHER SPECIFIED PREGNANCY RELATED CONDITIONS, UNSPECIFIED TRIMESTER: ICD-10-CM

## 2022-05-12 LAB
ALBUMIN SERPL ELPH-MCNC: 3 G/DL — LOW (ref 3.3–5)
ALP SERPL-CCNC: 155 U/L — HIGH (ref 40–120)
ALT FLD-CCNC: 6 U/L — SIGNIFICANT CHANGE UP (ref 4–33)
ANION GAP SERPL CALC-SCNC: 11 MMOL/L — SIGNIFICANT CHANGE UP (ref 7–14)
APPEARANCE UR: ABNORMAL
APTT BLD: 26.7 SEC — LOW (ref 27–36.3)
AST SERPL-CCNC: 16 U/L — SIGNIFICANT CHANGE UP (ref 4–32)
BACTERIA # UR AUTO: ABNORMAL
BASOPHILS # BLD AUTO: 0.02 K/UL — SIGNIFICANT CHANGE UP (ref 0–0.2)
BASOPHILS NFR BLD AUTO: 0.2 % — SIGNIFICANT CHANGE UP (ref 0–2)
BILIRUB SERPL-MCNC: 0.2 MG/DL — SIGNIFICANT CHANGE UP (ref 0.2–1.2)
BILIRUB UR-MCNC: NEGATIVE — SIGNIFICANT CHANGE UP
BUN SERPL-MCNC: 12 MG/DL — SIGNIFICANT CHANGE UP (ref 7–23)
CALCIUM SERPL-MCNC: 8.4 MG/DL — SIGNIFICANT CHANGE UP (ref 8.4–10.5)
CHLORIDE SERPL-SCNC: 103 MMOL/L — SIGNIFICANT CHANGE UP (ref 98–107)
CO2 SERPL-SCNC: 19 MMOL/L — LOW (ref 22–31)
COLOR SPEC: YELLOW — SIGNIFICANT CHANGE UP
CREAT ?TM UR-MCNC: 88 MG/DL — SIGNIFICANT CHANGE UP
CREAT SERPL-MCNC: 0.74 MG/DL — SIGNIFICANT CHANGE UP (ref 0.5–1.3)
DIFF PNL FLD: ABNORMAL
EGFR: 107 ML/MIN/1.73M2 — SIGNIFICANT CHANGE UP
EOSINOPHIL # BLD AUTO: 0.1 K/UL — SIGNIFICANT CHANGE UP (ref 0–0.5)
EOSINOPHIL NFR BLD AUTO: 1.2 % — SIGNIFICANT CHANGE UP (ref 0–6)
EPI CELLS # UR: 9 /HPF — HIGH (ref 0–5)
FIBRINOGEN PPP-MCNC: 665 MG/DL — HIGH (ref 330–520)
GLUCOSE SERPL-MCNC: 86 MG/DL — SIGNIFICANT CHANGE UP (ref 70–99)
GLUCOSE UR QL: NEGATIVE — SIGNIFICANT CHANGE UP
HCT VFR BLD CALC: 29.6 % — LOW (ref 34.5–45)
HGB BLD-MCNC: 10 G/DL — LOW (ref 11.5–15.5)
HYALINE CASTS # UR AUTO: 2 /LPF — SIGNIFICANT CHANGE UP (ref 0–7)
IANC: 6.32 K/UL — SIGNIFICANT CHANGE UP (ref 1.8–7.4)
IMM GRANULOCYTES NFR BLD AUTO: 0.4 % — SIGNIFICANT CHANGE UP (ref 0–1.5)
INR BLD: <0.9 RATIO — SIGNIFICANT CHANGE UP (ref 0.88–1.16)
KETONES UR-MCNC: NEGATIVE — SIGNIFICANT CHANGE UP
LDH SERPL L TO P-CCNC: 215 U/L — SIGNIFICANT CHANGE UP (ref 135–225)
LEUKOCYTE ESTERASE UR-ACNC: ABNORMAL
LYMPHOCYTES # BLD AUTO: 1.23 K/UL — SIGNIFICANT CHANGE UP (ref 1–3.3)
LYMPHOCYTES # BLD AUTO: 15 % — SIGNIFICANT CHANGE UP (ref 13–44)
MCHC RBC-ENTMCNC: 32.6 PG — SIGNIFICANT CHANGE UP (ref 27–34)
MCHC RBC-ENTMCNC: 33.8 GM/DL — SIGNIFICANT CHANGE UP (ref 32–36)
MCV RBC AUTO: 96.4 FL — SIGNIFICANT CHANGE UP (ref 80–100)
MONOCYTES # BLD AUTO: 0.51 K/UL — SIGNIFICANT CHANGE UP (ref 0–0.9)
MONOCYTES NFR BLD AUTO: 6.2 % — SIGNIFICANT CHANGE UP (ref 2–14)
NEUTROPHILS # BLD AUTO: 6.32 K/UL — SIGNIFICANT CHANGE UP (ref 1.8–7.4)
NEUTROPHILS NFR BLD AUTO: 77 % — SIGNIFICANT CHANGE UP (ref 43–77)
NITRITE UR-MCNC: NEGATIVE — SIGNIFICANT CHANGE UP
NRBC # BLD: 0 /100 WBCS — SIGNIFICANT CHANGE UP
NRBC # FLD: 0 K/UL — SIGNIFICANT CHANGE UP
PH UR: 6.5 — SIGNIFICANT CHANGE UP (ref 5–8)
PLATELET # BLD AUTO: 176 K/UL — SIGNIFICANT CHANGE UP (ref 150–400)
POTASSIUM SERPL-MCNC: 4.1 MMOL/L — SIGNIFICANT CHANGE UP (ref 3.5–5.3)
POTASSIUM SERPL-SCNC: 4.1 MMOL/L — SIGNIFICANT CHANGE UP (ref 3.5–5.3)
PROT ?TM UR-MCNC: 30 MG/DL — SIGNIFICANT CHANGE UP
PROT ?TM UR-MCNC: 30 MG/DL — SIGNIFICANT CHANGE UP
PROT SERPL-MCNC: 5.9 G/DL — LOW (ref 6–8.3)
PROT UR-MCNC: ABNORMAL
PROT/CREAT UR-RTO: 0.3 RATIO — HIGH (ref 0–0.2)
PROTHROM AB SERPL-ACNC: 10.2 SEC — LOW (ref 10.5–13.4)
RBC # BLD: 3.07 M/UL — LOW (ref 3.8–5.2)
RBC # FLD: 13.3 % — SIGNIFICANT CHANGE UP (ref 10.3–14.5)
RBC CASTS # UR COMP ASSIST: 2 /HPF — SIGNIFICANT CHANGE UP (ref 0–4)
SODIUM SERPL-SCNC: 133 MMOL/L — LOW (ref 135–145)
SP GR SPEC: 1.02 — SIGNIFICANT CHANGE UP (ref 1–1.05)
URATE SERPL-MCNC: 5.4 MG/DL — SIGNIFICANT CHANGE UP (ref 2.5–7)
UROBILINOGEN FLD QL: SIGNIFICANT CHANGE UP
WBC # BLD: 8.21 K/UL — SIGNIFICANT CHANGE UP (ref 3.8–10.5)
WBC # FLD AUTO: 8.21 K/UL — SIGNIFICANT CHANGE UP (ref 3.8–10.5)
WBC UR QL: 84 /HPF — HIGH (ref 0–5)

## 2022-05-12 PROCEDURE — 76818 FETAL BIOPHYS PROFILE W/NST: CPT | Mod: 59

## 2022-05-12 PROCEDURE — 76818 FETAL BIOPHYS PROFILE W/NST: CPT

## 2022-05-12 PROCEDURE — 99204 OFFICE O/P NEW MOD 45 MIN: CPT

## 2022-05-12 PROCEDURE — 76816 OB US FOLLOW-UP PER FETUS: CPT | Mod: 59

## 2022-05-12 PROCEDURE — 76818 FETAL BIOPHYS PROFILE W/NST: CPT | Mod: 26

## 2022-05-12 PROCEDURE — 76816 OB US FOLLOW-UP PER FETUS: CPT

## 2022-05-12 NOTE — OB PROVIDER TRIAGE NOTE - NSHPPHYSICALEXAM_GEN_ALL_CORE
Vital Signs Last 24 Hrs  T(C): 36.9 (12 May 2022 17:50), Max: 36.9 (12 May 2022 17:35)  T(F): 98.42 (12 May 2022 17:50), Max: 98.42 (12 May 2022 17:50)  HR: 95 (12 May 2022 18:33) (88 - 95)  BP: 142/78 (12 May 2022 18:33) (130/82 - 142/78)  BP(mean): --  RR: 16 (12 May 2022 17:35) (16 - 16)  SpO2: --    A&O x3  CTAB  abdomen: gravid, soft, nontender    As per Dr Doherty patient does not need monitoring as patient had NST/BPP today in ATU Vital Signs Last 24 Hrs  T(C): 36.9 (12 May 2022 17:50), Max: 36.9 (12 May 2022 17:35)  T(F): 98.42 (12 May 2022 17:50), Max: 98.42 (12 May 2022 17:50)  HR: 95 (12 May 2022 18:33) (88 - 95)  BP: 142/78 (12 May 2022 18:33) (130/82 - 142/78)  BP(mean): --  RR: 16 (12 May 2022 17:35) (16 - 16)  SpO2: --    A&O x3  CTAB  abdomen: gravid, soft, nontender    As per Dr Doherty patient does not need monitoring as patient had NST/BPP today in ATU  ATU visit report reviewed  fetus A- vtx, posterior placenta, bpp 8/8, mvp 6.5, efw 1861g   fetus B- vtx, posterior placenta bpp 8/8, mvp 5.24 efw 1566g, umbilical artery doppler velocity is elevated with end diastolic flow present Vital Signs Last 24 Hrs  T(C): 36.9 (12 May 2022 17:50), Max: 36.9 (12 May 2022 17:35)  T(F): 98.42 (12 May 2022 17:50), Max: 98.42 (12 May 2022 17:50)  HR: 95 (12 May 2022 18:33) (88 - 95)  BP: 142/78 (12 May 2022 18:33) (130/82 - 142/78)  BP(mean): --  RR: 16 (12 May 2022 17:35) (16 - 16)  SpO2: --    A&O x3  CTAB  abdomen: gravid, soft, nontender  FHT baby A: baseline 150 + accelerations negative decelerations moderate variability  FHT baby B baseline 140 + accelerations, occasional variable, moderate variability  toco: none no pain    As per Dr Doherty patient does not need monitoring as patient had NST/BPP today in ATU  ATU visit report reviewed  fetus A- vtx, posterior placenta, bpp 8/8, mvp 6.5, efw 1861g   fetus B- vtx, posterior placenta bpp 8/8, mvp 5.24 efw 1566g, umbilical artery doppler velocity is elevated with end diastolic flow present

## 2022-05-12 NOTE — OB RN TRIAGE NOTE - NSICDXPASTMEDICALHX_GEN_ALL_CORE_FT
PAST MEDICAL HISTORY:  Enlarged Thyroid     GERD (Gastroesophageal Reflux Disease)     MVP (Mitral Valve Prolapse)

## 2022-05-12 NOTE — OB PROVIDER TRIAGE NOTE - HISTORY OF PRESENT ILLNESS
This is a 36 year old  at 32.3 weeks gestational age with di-di tiup presents from ATU appointment  with BPs of 140/96, 146/100, 139/96. Denies headache, blurry vision, epigastric pain, nausea, vomitting or swelling. Reports +GFM, x2, denies LOF, VB or contractions.   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

## 2022-05-12 NOTE — OB PROVIDER TRIAGE NOTE - ADDITIONAL INSTRUCTIONS
d/w Dr Martin discharge home jane twins @ 32.3 wks with elevated BPs today, and previous 24hr urine:370 (22)  maternal and fetal surveillance reassuring  rest activity as tolerated  increase water intake   labor precautions instructed  take blood pressures 3 x day- call office if BP >140/90, go to L&D if BP> 160/100  return to triage for any new headaches, vision changes, new epigastric pain, n/v or any concerns  keep all OB appointments  may use maternity band for abdominal support while standing  return for vaginal bleeding, leakage of fluid, decreased fetal movement or any concerns  v/w discharge instructions given with verbal understanding by patient

## 2022-05-12 NOTE — OB RN TRIAGE NOTE - FALL HARM RISK - UNIVERSAL INTERVENTIONS
Bed in lowest position, wheels locked, appropriate side rails in place/Call bell, personal items and telephone in reach/Instruct patient to call for assistance before getting out of bed or chair/Non-slip footwear when patient is out of bed/[place] to call system/Physically safe environment - no spills, clutter or unnecessary equipment/Purposeful Proactive Rounding/Room/bathroom lighting operational, light cord in reach

## 2022-05-12 NOTE — OB PROVIDER TRIAGE NOTE - NSOBPROVIDERNOTE_OBGYN_ALL_OB_FT
[facility] labs sent at 1830 HELLP labs sent at 1830    d/w Dr Martin discharge home jane twins @ 32.3 wks with elevated BPs today, and previous 24hr urine:370 (22)  maternal and fetal surveillance reassuring  rest activity as tolerated  increase water intake   labor precautions instructed  take blood pressures 3 x day- call office if BP >140/90, go to L&D if BP> 160/100  return to triage for any new headaches, vision changes, new epigastric pain, n/v or any concerns  keep all OB appointments  may use maternity band for abdominal support while standing  return for vaginal bleeding, leakage of fluid, decreased fetal movement or any concerns  v/w discharge instructions given with verbal understanding by patient

## 2022-05-12 NOTE — OB PROVIDER TRIAGE NOTE - NSHPLABSRESULTS_GEN_ALL_CORE
10.0   8.21  )-----------( 176      ( 12 May 2022 18:34 )             29.6       133<L>  |  103  |  12  ----------------------------<  86  4.1   |  19<L>  |  0.74    Ca    8.4      12 May 2022 18:34    TPro  5.9<L>  /  Alb  3.0<L>  /  TBili  0.2  /  DBili  x   /  AST  16  /  ALT  6   /  AlkPhos  155<H>    Urinalysis Basic - ( 12 May 2022 20:09 )  PT/INR - ( 12 May 2022 18:34 )   PT: 10.2 sec;   INR: <0.90 ratio         PTT - ( 12 May 2022 18:34 )  PTT:26.7 sec  Color: Yellow / Appearance: Slightly Turbid / S.016 / pH: x  Gluc: x / Ketone: Negative  / Bili: Negative / Urobili: <2 mg/dL   Blood: x / Protein: 30 mg/dL / Nitrite: Negative   Leuk Esterase: Large / RBC: 2 /HPF / WBC 84 /HPF   Sq Epi: x / Non Sq Epi: 9 /HPF / Bacteria: Moderate    PCR 0.3

## 2022-05-13 ENCOUNTER — APPOINTMENT (OUTPATIENT)
Dept: OBGYN | Facility: CLINIC | Age: 37
End: 2022-05-13
Payer: COMMERCIAL

## 2022-05-13 VITALS
DIASTOLIC BLOOD PRESSURE: 90 MMHG | SYSTOLIC BLOOD PRESSURE: 130 MMHG | BODY MASS INDEX: 38.76 KG/M2 | WEIGHT: 227 LBS | HEIGHT: 64 IN

## 2022-05-13 PROCEDURE — 0502F SUBSEQUENT PRENATAL CARE: CPT

## 2022-05-16 ENCOUNTER — ASOB RESULT (OUTPATIENT)
Age: 37
End: 2022-05-16

## 2022-05-16 ENCOUNTER — APPOINTMENT (OUTPATIENT)
Dept: ANTEPARTUM | Facility: CLINIC | Age: 37
End: 2022-05-16
Payer: COMMERCIAL

## 2022-05-16 ENCOUNTER — NON-APPOINTMENT (OUTPATIENT)
Age: 37
End: 2022-05-16

## 2022-05-16 PROCEDURE — 76818 FETAL BIOPHYS PROFILE W/NST: CPT

## 2022-05-16 PROCEDURE — 76820 UMBILICAL ARTERY ECHO: CPT

## 2022-05-17 ENCOUNTER — NON-APPOINTMENT (OUTPATIENT)
Age: 37
End: 2022-05-17

## 2022-05-20 ENCOUNTER — ASOB RESULT (OUTPATIENT)
Age: 37
End: 2022-05-20

## 2022-05-20 ENCOUNTER — APPOINTMENT (OUTPATIENT)
Dept: ANTEPARTUM | Facility: CLINIC | Age: 37
End: 2022-05-20
Payer: COMMERCIAL

## 2022-05-20 PROCEDURE — 76820 UMBILICAL ARTERY ECHO: CPT

## 2022-05-20 PROCEDURE — 76818 FETAL BIOPHYS PROFILE W/NST: CPT

## 2022-05-24 ENCOUNTER — APPOINTMENT (OUTPATIENT)
Dept: ANTEPARTUM | Facility: CLINIC | Age: 37
End: 2022-05-24
Payer: COMMERCIAL

## 2022-05-24 ENCOUNTER — ASOB RESULT (OUTPATIENT)
Age: 37
End: 2022-05-24

## 2022-05-24 PROCEDURE — 76818 FETAL BIOPHYS PROFILE W/NST: CPT | Mod: 59

## 2022-05-24 PROCEDURE — 76818 FETAL BIOPHYS PROFILE W/NST: CPT

## 2022-05-25 ENCOUNTER — APPOINTMENT (OUTPATIENT)
Dept: ANTEPARTUM | Facility: CLINIC | Age: 37
End: 2022-05-25

## 2022-05-27 ENCOUNTER — ASOB RESULT (OUTPATIENT)
Age: 37
End: 2022-05-27

## 2022-05-27 ENCOUNTER — APPOINTMENT (OUTPATIENT)
Dept: OBGYN | Facility: CLINIC | Age: 37
End: 2022-05-27
Payer: COMMERCIAL

## 2022-05-27 ENCOUNTER — APPOINTMENT (OUTPATIENT)
Dept: ANTEPARTUM | Facility: CLINIC | Age: 37
End: 2022-05-27
Payer: COMMERCIAL

## 2022-05-27 VITALS
HEIGHT: 64 IN | BODY MASS INDEX: 39.09 KG/M2 | WEIGHT: 229 LBS | DIASTOLIC BLOOD PRESSURE: 95 MMHG | SYSTOLIC BLOOD PRESSURE: 154 MMHG

## 2022-05-27 DIAGNOSIS — O14.93 UNSPECIFIED PRE-ECLAMPSIA, THIRD TRIMESTER: ICD-10-CM

## 2022-05-27 DIAGNOSIS — O36.5992: ICD-10-CM

## 2022-05-27 PROCEDURE — 0502F SUBSEQUENT PRENATAL CARE: CPT

## 2022-05-27 PROCEDURE — 76816 OB US FOLLOW-UP PER FETUS: CPT

## 2022-05-27 PROCEDURE — 76818 FETAL BIOPHYS PROFILE W/NST: CPT | Mod: 59

## 2022-05-27 NOTE — HISTORY OF PRESENT ILLNESS
Continued Stay Note  [facility]     Patient Name: Cam Gonsalves  MRN: [id number]  Today's Date: 3/1/2021    Admit Date: 2/24/2021    Discharge Plan     Row Name 03/01/21 1515       Plan    Plan  Return to [facility]    Plan Comments  Left message for opal More about possibility of DC tomorrow.  Spoke with [facility] - they are working on pre-cert.  They are aware that patient will need 14 days of IV antibiotics.  Packet in CCP office.  BHumeniuk RN            Expected Discharge Date and Time     Expected Discharge Date Expected Discharge Time    Mar 2, 2021             Becky S. Humeniuk, RN     [FreeTextEntry1] : see flowsheet

## 2022-05-28 ENCOUNTER — INPATIENT (INPATIENT)
Facility: HOSPITAL | Age: 37
LOS: 3 days | Discharge: HOME | End: 2022-06-01
Attending: OBSTETRICS & GYNECOLOGY | Admitting: OBSTETRICS & GYNECOLOGY
Payer: COMMERCIAL

## 2022-05-28 VITALS — TEMPERATURE: 98 F

## 2022-05-28 DIAGNOSIS — D27.9 BENIGN NEOPLASM OF UNSPECIFIED OVARY: Chronic | ICD-10-CM

## 2022-05-28 DIAGNOSIS — O26.899 OTHER SPECIFIED PREGNANCY RELATED CONDITIONS, UNSPECIFIED TRIMESTER: ICD-10-CM

## 2022-05-28 DIAGNOSIS — Z3A.00 WEEKS OF GESTATION OF PREGNANCY NOT SPECIFIED: ICD-10-CM

## 2022-05-28 RX ORDER — AMPICILLIN TRIHYDRATE 250 MG
1 CAPSULE ORAL EVERY 4 HOURS
Refills: 0 | Status: DISCONTINUED | OUTPATIENT
Start: 2022-05-28 | End: 2022-05-29

## 2022-05-28 RX ORDER — AMPICILLIN TRIHYDRATE 250 MG
2 CAPSULE ORAL ONCE
Refills: 0 | Status: COMPLETED | OUTPATIENT
Start: 2022-05-28 | End: 2022-05-28

## 2022-05-28 RX ORDER — SODIUM CHLORIDE 9 MG/ML
1000 INJECTION, SOLUTION INTRAVENOUS ONCE
Refills: 0 | Status: DISCONTINUED | OUTPATIENT
Start: 2022-05-28 | End: 2022-05-29

## 2022-05-28 RX ORDER — OXYTOCIN 10 UNIT/ML
16.67 VIAL (ML) INJECTION
Qty: 20 | Refills: 0 | Status: DISCONTINUED | OUTPATIENT
Start: 2022-05-28 | End: 2022-05-31

## 2022-05-28 RX ORDER — SODIUM CHLORIDE 9 MG/ML
1000 INJECTION, SOLUTION INTRAVENOUS
Refills: 0 | Status: DISCONTINUED | OUTPATIENT
Start: 2022-05-28 | End: 2022-05-29

## 2022-05-28 RX ORDER — OXYTOCIN 10 UNIT/ML
VIAL (ML) INJECTION
Qty: 30 | Refills: 0 | Status: DISCONTINUED | OUTPATIENT
Start: 2022-05-28 | End: 2022-05-29

## 2022-05-28 NOTE — OB PROVIDER TRIAGE NOTE - NSHPPHYSICALEXAM_GEN_ALL_CORE
abd soft gravid NT  CVP RRR  LS clear bilaterally  TAS: baby A LLQ vertex  baby b RMQ vertex  SSE: + poooling clear fluid, + nitrazine + fern  SVE: 2/80/-3  FHT:   baby A baseline 130, + accelerations, negative decelerations  baby B baseline 140, + accelerations negative decelerations  toco: uterine irritability, pt reports abd cramping   Vital Signs Last 24 Hrs  T(C): 36.9 (28 May 2022 23:08), Max: 36.9 (28 May 2022 22:56)  T(F): 98.4 (28 May 2022 23:08), Max: 98.42 (28 May 2022 22:56)  HR: 83 (28 May 2022 23:56) (81 - 98)  BP: 160/94 (28 May 2022 23:56) (146/91 - 160/103)  BP(mean): --  RR: 17 (28 May 2022 23:08) (17 - 17)  SpO2: --

## 2022-05-28 NOTE — OB PROVIDER TRIAGE NOTE - HISTORY OF PRESENT ILLNESS
37 yo  @ 34.5 wks, di di twins c/o LOF since 2pm clear fluid, then felt a few gushes still leaking, denies vb, + GFM x 2. AP course complicated by baby B IUGR elevated dopplers. PEC, covid in 3/2022 s/p monoclonal antibodies denies fever chills ha n/v new swelling vision changes epigastric pain cp sob or cough. last saw OB and high risk . next appointment . scheduled for delivery 22.    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

## 2022-05-28 NOTE — OB RN TRIAGE NOTE - NSICDXPASTMEDICALHX_GEN_ALL_CORE_FT
PAST MEDICAL HISTORY:  Enlarged Thyroid     GERD (Gastroesophageal Reflux Disease)     MVP (Mitral Valve Prolapse)      PAST MEDICAL HISTORY:  2019 novel coronavirus disease (COVID-19) 3/22, antibodies    Enlarged Thyroid yearly follow up    GERD (Gastroesophageal Reflux Disease)     MVP (Mitral Valve Prolapse)

## 2022-05-28 NOTE — OB RN TRIAGE NOTE - FALL HARM RISK - UNIVERSAL INTERVENTIONS
Bed in lowest position, wheels locked, appropriate side rails in place/Call bell, personal items and telephone in reach/Instruct patient to call for assistance before getting out of bed or chair/Non-slip footwear when patient is out of bed/[place] to call system/Physically safe environment - no spills, clutter or unnecessary equipment/Purposeful Proactive Rounding/Room/bathroom lighting operational, light cord in reach

## 2022-05-28 NOTE — OB RN TRIAGE NOTE - NSICDXPASTSURGICALHX_GEN_ALL_CORE_FT
PAST SURGICAL HISTORY:  Dermoid cyst of ovary     History of Bilateral Breast Reduction Surgery 2007    S/P LEECHAYA 2006

## 2022-05-28 NOTE — OB PROVIDER TRIAGE NOTE - NS_OBGYNHISTORY_OBGYN_ALL_OB_FT
PAST MEDICAL HISTORY:  HTN (hypertension)     Seizure      5/17/18 NSD F 6#9 (DX PEC LAST WEEK OF PREG., NO MEDS)  Fibroids  Dermoid ovarian cyst

## 2022-05-29 ENCOUNTER — RESULT REVIEW (OUTPATIENT)
Age: 37
End: 2022-05-29

## 2022-05-29 DIAGNOSIS — O42.90 PREMATURE RUPTURE OF MEMBRANES, UNSPECIFIED AS TO LENGTH OF TIME BETWEEN RUPTURE AND ONSET OF LABOR, UNSPECIFIED WEEKS OF GESTATION: ICD-10-CM

## 2022-05-29 LAB
ALBUMIN SERPL ELPH-MCNC: 2.9 G/DL — LOW (ref 3.3–5)
ALBUMIN SERPL ELPH-MCNC: 3 G/DL — LOW (ref 3.3–5)
ALBUMIN SERPL ELPH-MCNC: 3.2 G/DL — LOW (ref 3.3–5)
ALBUMIN SERPL ELPH-MCNC: 3.5 G/DL — SIGNIFICANT CHANGE UP (ref 3.3–5)
ALP SERPL-CCNC: 187 U/L — HIGH (ref 40–120)
ALP SERPL-CCNC: 195 U/L — HIGH (ref 40–120)
ALP SERPL-CCNC: 198 U/L — HIGH (ref 40–120)
ALP SERPL-CCNC: 211 U/L — HIGH (ref 40–120)
ALT FLD-CCNC: 10 U/L — SIGNIFICANT CHANGE UP (ref 4–33)
ALT FLD-CCNC: 5 U/L — SIGNIFICANT CHANGE UP (ref 4–33)
ALT FLD-CCNC: 8 U/L — SIGNIFICANT CHANGE UP (ref 4–33)
ALT FLD-CCNC: 8 U/L — SIGNIFICANT CHANGE UP (ref 4–33)
ANION GAP SERPL CALC-SCNC: 10 MMOL/L — SIGNIFICANT CHANGE UP (ref 7–14)
ANION GAP SERPL CALC-SCNC: 13 MMOL/L — SIGNIFICANT CHANGE UP (ref 7–14)
ANION GAP SERPL CALC-SCNC: 13 MMOL/L — SIGNIFICANT CHANGE UP (ref 7–14)
ANION GAP SERPL CALC-SCNC: 14 MMOL/L — SIGNIFICANT CHANGE UP (ref 7–14)
APPEARANCE UR: CLEAR — SIGNIFICANT CHANGE UP
APTT BLD: 22.8 SEC — LOW (ref 27–36.3)
APTT BLD: 23.3 SEC — LOW (ref 27–36.3)
APTT BLD: 24.7 SEC — LOW (ref 27–36.3)
APTT BLD: 25.6 SEC — LOW (ref 27–36.3)
AST SERPL-CCNC: 24 U/L — SIGNIFICANT CHANGE UP (ref 4–32)
AST SERPL-CCNC: 25 U/L — SIGNIFICANT CHANGE UP (ref 4–32)
AST SERPL-CCNC: 26 U/L — SIGNIFICANT CHANGE UP (ref 4–32)
AST SERPL-CCNC: 29 U/L — SIGNIFICANT CHANGE UP (ref 4–32)
BACTERIA # UR AUTO: ABNORMAL
BASOPHILS # BLD AUTO: 0.02 K/UL — SIGNIFICANT CHANGE UP (ref 0–0.2)
BASOPHILS NFR BLD AUTO: 0.2 % — SIGNIFICANT CHANGE UP (ref 0–2)
BILIRUB SERPL-MCNC: 0.3 MG/DL — SIGNIFICANT CHANGE UP (ref 0.2–1.2)
BILIRUB SERPL-MCNC: 0.6 MG/DL — SIGNIFICANT CHANGE UP (ref 0.2–1.2)
BILIRUB SERPL-MCNC: 0.7 MG/DL — SIGNIFICANT CHANGE UP (ref 0.2–1.2)
BILIRUB SERPL-MCNC: 0.7 MG/DL — SIGNIFICANT CHANGE UP (ref 0.2–1.2)
BILIRUB UR-MCNC: NEGATIVE — SIGNIFICANT CHANGE UP
BLD GP AB SCN SERPL QL: NEGATIVE — SIGNIFICANT CHANGE UP
BUN SERPL-MCNC: 12 MG/DL — SIGNIFICANT CHANGE UP (ref 7–23)
BUN SERPL-MCNC: 14 MG/DL — SIGNIFICANT CHANGE UP (ref 7–23)
BUN SERPL-MCNC: 14 MG/DL — SIGNIFICANT CHANGE UP (ref 7–23)
BUN SERPL-MCNC: 16 MG/DL — SIGNIFICANT CHANGE UP (ref 7–23)
CALCIUM SERPL-MCNC: 6.9 MG/DL — LOW (ref 8.4–10.5)
CALCIUM SERPL-MCNC: 7.4 MG/DL — LOW (ref 8.4–10.5)
CALCIUM SERPL-MCNC: 8.3 MG/DL — LOW (ref 8.4–10.5)
CALCIUM SERPL-MCNC: 8.7 MG/DL — SIGNIFICANT CHANGE UP (ref 8.4–10.5)
CHLORIDE SERPL-SCNC: 102 MMOL/L — SIGNIFICANT CHANGE UP (ref 98–107)
CHLORIDE SERPL-SCNC: 102 MMOL/L — SIGNIFICANT CHANGE UP (ref 98–107)
CHLORIDE SERPL-SCNC: 105 MMOL/L — SIGNIFICANT CHANGE UP (ref 98–107)
CHLORIDE SERPL-SCNC: 98 MMOL/L — SIGNIFICANT CHANGE UP (ref 98–107)
CO2 SERPL-SCNC: 17 MMOL/L — LOW (ref 22–31)
CO2 SERPL-SCNC: 18 MMOL/L — LOW (ref 22–31)
CO2 SERPL-SCNC: 19 MMOL/L — LOW (ref 22–31)
CO2 SERPL-SCNC: 20 MMOL/L — LOW (ref 22–31)
COLOR SPEC: YELLOW — SIGNIFICANT CHANGE UP
COVID-19 SPIKE DOMAIN AB INTERP: POSITIVE
COVID-19 SPIKE DOMAIN ANTIBODY RESULT: >250 U/ML — HIGH
CREAT ?TM UR-MCNC: 289 MG/DL — SIGNIFICANT CHANGE UP
CREAT SERPL-MCNC: 0.92 MG/DL — SIGNIFICANT CHANGE UP (ref 0.5–1.3)
CREAT SERPL-MCNC: 0.92 MG/DL — SIGNIFICANT CHANGE UP (ref 0.5–1.3)
CREAT SERPL-MCNC: 0.93 MG/DL — SIGNIFICANT CHANGE UP (ref 0.5–1.3)
CREAT SERPL-MCNC: 0.97 MG/DL — SIGNIFICANT CHANGE UP (ref 0.5–1.3)
DIFF PNL FLD: ABNORMAL
EGFR: 78 ML/MIN/1.73M2 — SIGNIFICANT CHANGE UP
EGFR: 82 ML/MIN/1.73M2 — SIGNIFICANT CHANGE UP
EGFR: 83 ML/MIN/1.73M2 — SIGNIFICANT CHANGE UP
EGFR: 83 ML/MIN/1.73M2 — SIGNIFICANT CHANGE UP
EOSINOPHIL # BLD AUTO: 0.01 K/UL — SIGNIFICANT CHANGE UP (ref 0–0.5)
EOSINOPHIL # BLD AUTO: 0.04 K/UL — SIGNIFICANT CHANGE UP (ref 0–0.5)
EOSINOPHIL # BLD AUTO: 0.08 K/UL — SIGNIFICANT CHANGE UP (ref 0–0.5)
EOSINOPHIL # BLD AUTO: 0.09 K/UL — SIGNIFICANT CHANGE UP (ref 0–0.5)
EOSINOPHIL NFR BLD AUTO: 0.1 % — SIGNIFICANT CHANGE UP (ref 0–6)
EOSINOPHIL NFR BLD AUTO: 0.4 % — SIGNIFICANT CHANGE UP (ref 0–6)
EOSINOPHIL NFR BLD AUTO: 0.9 % — SIGNIFICANT CHANGE UP (ref 0–6)
EOSINOPHIL NFR BLD AUTO: 1 % — SIGNIFICANT CHANGE UP (ref 0–6)
EPI CELLS # UR: 2 /HPF — SIGNIFICANT CHANGE UP (ref 0–5)
FIBRINOGEN PPP-MCNC: 606 MG/DL — HIGH (ref 330–520)
FIBRINOGEN PPP-MCNC: 675 MG/DL — HIGH (ref 330–520)
FIBRINOGEN PPP-MCNC: 677 MG/DL — HIGH (ref 330–520)
FIBRINOGEN PPP-MCNC: 721 MG/DL — HIGH (ref 330–520)
GLUCOSE SERPL-MCNC: 106 MG/DL — HIGH (ref 70–99)
GLUCOSE SERPL-MCNC: 162 MG/DL — HIGH (ref 70–99)
GLUCOSE SERPL-MCNC: 70 MG/DL — SIGNIFICANT CHANGE UP (ref 70–99)
GLUCOSE SERPL-MCNC: 83 MG/DL — SIGNIFICANT CHANGE UP (ref 70–99)
GLUCOSE UR QL: NEGATIVE — SIGNIFICANT CHANGE UP
GRAN CASTS # UR COMP ASSIST: 4 /LPF — HIGH
HCT VFR BLD CALC: 25.9 % — LOW (ref 34.5–45)
HCT VFR BLD CALC: 29.5 % — LOW (ref 34.5–45)
HCT VFR BLD CALC: 30.3 % — LOW (ref 34.5–45)
HCT VFR BLD CALC: 31.8 % — LOW (ref 34.5–45)
HGB BLD-MCNC: 10.4 G/DL — LOW (ref 11.5–15.5)
HGB BLD-MCNC: 8.8 G/DL — LOW (ref 11.5–15.5)
HGB BLD-MCNC: 9.7 G/DL — LOW (ref 11.5–15.5)
HGB BLD-MCNC: 9.7 G/DL — LOW (ref 11.5–15.5)
HYALINE CASTS # UR AUTO: 2 /LPF — SIGNIFICANT CHANGE UP (ref 0–7)
IANC: 6.61 K/UL — SIGNIFICANT CHANGE UP (ref 1.8–7.4)
IANC: 6.64 K/UL — SIGNIFICANT CHANGE UP (ref 1.8–7.4)
IANC: 9.47 K/UL — HIGH (ref 1.8–7.4)
IANC: 9.93 K/UL — HIGH (ref 1.8–7.4)
IMM GRANULOCYTES NFR BLD AUTO: 0.5 % — SIGNIFICANT CHANGE UP (ref 0–1.5)
IMM GRANULOCYTES NFR BLD AUTO: 0.5 % — SIGNIFICANT CHANGE UP (ref 0–1.5)
IMM GRANULOCYTES NFR BLD AUTO: 0.6 % — SIGNIFICANT CHANGE UP (ref 0–1.5)
IMM GRANULOCYTES NFR BLD AUTO: 0.7 % — SIGNIFICANT CHANGE UP (ref 0–1.5)
INR BLD: 0.92 RATIO — SIGNIFICANT CHANGE UP (ref 0.88–1.16)
INR BLD: <0.9 RATIO — LOW (ref 0.88–1.16)
KETONES UR-MCNC: NEGATIVE — SIGNIFICANT CHANGE UP
LDH SERPL L TO P-CCNC: 259 U/L — HIGH (ref 135–225)
LDH SERPL L TO P-CCNC: 262 U/L — HIGH (ref 135–225)
LDH SERPL L TO P-CCNC: 273 U/L — HIGH (ref 135–225)
LDH SERPL L TO P-CCNC: 345 U/L — HIGH (ref 135–225)
LEUKOCYTE ESTERASE UR-ACNC: ABNORMAL
LYMPHOCYTES # BLD AUTO: 0.6 K/UL — LOW (ref 1–3.3)
LYMPHOCYTES # BLD AUTO: 0.78 K/UL — LOW (ref 1–3.3)
LYMPHOCYTES # BLD AUTO: 1.28 K/UL — SIGNIFICANT CHANGE UP (ref 1–3.3)
LYMPHOCYTES # BLD AUTO: 1.33 K/UL — SIGNIFICANT CHANGE UP (ref 1–3.3)
LYMPHOCYTES # BLD AUTO: 14.7 % — SIGNIFICANT CHANGE UP (ref 13–44)
LYMPHOCYTES # BLD AUTO: 15.3 % — SIGNIFICANT CHANGE UP (ref 13–44)
LYMPHOCYTES # BLD AUTO: 5.4 % — LOW (ref 13–44)
LYMPHOCYTES # BLD AUTO: 7.2 % — LOW (ref 13–44)
MAGNESIUM SERPL-MCNC: 5.7 MG/DL — HIGH (ref 1.6–2.6)
MAGNESIUM SERPL-MCNC: 6.5 MG/DL — HIGH (ref 1.6–2.6)
MCHC RBC-ENTMCNC: 31.5 PG — SIGNIFICANT CHANGE UP (ref 27–34)
MCHC RBC-ENTMCNC: 32 GM/DL — SIGNIFICANT CHANGE UP (ref 32–36)
MCHC RBC-ENTMCNC: 32.1 PG — SIGNIFICANT CHANGE UP (ref 27–34)
MCHC RBC-ENTMCNC: 32.3 PG — SIGNIFICANT CHANGE UP (ref 27–34)
MCHC RBC-ENTMCNC: 32.6 PG — SIGNIFICANT CHANGE UP (ref 27–34)
MCHC RBC-ENTMCNC: 32.7 GM/DL — SIGNIFICANT CHANGE UP (ref 32–36)
MCHC RBC-ENTMCNC: 32.9 GM/DL — SIGNIFICANT CHANGE UP (ref 32–36)
MCHC RBC-ENTMCNC: 34 GM/DL — SIGNIFICANT CHANGE UP (ref 32–36)
MCV RBC AUTO: 101 FL — HIGH (ref 80–100)
MCV RBC AUTO: 94.5 FL — SIGNIFICANT CHANGE UP (ref 80–100)
MCV RBC AUTO: 95.8 FL — SIGNIFICANT CHANGE UP (ref 80–100)
MCV RBC AUTO: 99.7 FL — SIGNIFICANT CHANGE UP (ref 80–100)
MONOCYTES # BLD AUTO: 0.43 K/UL — SIGNIFICANT CHANGE UP (ref 0–0.9)
MONOCYTES # BLD AUTO: 0.48 K/UL — SIGNIFICANT CHANGE UP (ref 0–0.9)
MONOCYTES # BLD AUTO: 0.58 K/UL — SIGNIFICANT CHANGE UP (ref 0–0.9)
MONOCYTES # BLD AUTO: 0.64 K/UL — SIGNIFICANT CHANGE UP (ref 0–0.9)
MONOCYTES NFR BLD AUTO: 4 % — SIGNIFICANT CHANGE UP (ref 2–14)
MONOCYTES NFR BLD AUTO: 4.3 % — SIGNIFICANT CHANGE UP (ref 2–14)
MONOCYTES NFR BLD AUTO: 6.7 % — SIGNIFICANT CHANGE UP (ref 2–14)
MONOCYTES NFR BLD AUTO: 7.3 % — SIGNIFICANT CHANGE UP (ref 2–14)
NEUTROPHILS # BLD AUTO: 6.61 K/UL — SIGNIFICANT CHANGE UP (ref 1.8–7.4)
NEUTROPHILS # BLD AUTO: 6.64 K/UL — SIGNIFICANT CHANGE UP (ref 1.8–7.4)
NEUTROPHILS # BLD AUTO: 9.47 K/UL — HIGH (ref 1.8–7.4)
NEUTROPHILS # BLD AUTO: 9.93 K/UL — HIGH (ref 1.8–7.4)
NEUTROPHILS NFR BLD AUTO: 76.2 % — SIGNIFICANT CHANGE UP (ref 43–77)
NEUTROPHILS NFR BLD AUTO: 76.3 % — SIGNIFICANT CHANGE UP (ref 43–77)
NEUTROPHILS NFR BLD AUTO: 87.6 % — HIGH (ref 43–77)
NEUTROPHILS NFR BLD AUTO: 89.5 % — HIGH (ref 43–77)
NITRITE UR-MCNC: NEGATIVE — SIGNIFICANT CHANGE UP
NRBC # BLD: 0 /100 WBCS — SIGNIFICANT CHANGE UP
NRBC # FLD: 0 K/UL — SIGNIFICANT CHANGE UP
PH UR: 6 — SIGNIFICANT CHANGE UP (ref 5–8)
PLATELET # BLD AUTO: 160 K/UL — SIGNIFICANT CHANGE UP (ref 150–400)
PLATELET # BLD AUTO: 165 K/UL — SIGNIFICANT CHANGE UP (ref 150–400)
PLATELET # BLD AUTO: 168 K/UL — SIGNIFICANT CHANGE UP (ref 150–400)
PLATELET # BLD AUTO: 178 K/UL — SIGNIFICANT CHANGE UP (ref 150–400)
POTASSIUM SERPL-MCNC: 4.1 MMOL/L — SIGNIFICANT CHANGE UP (ref 3.5–5.3)
POTASSIUM SERPL-MCNC: 4.2 MMOL/L — SIGNIFICANT CHANGE UP (ref 3.5–5.3)
POTASSIUM SERPL-SCNC: 4.1 MMOL/L — SIGNIFICANT CHANGE UP (ref 3.5–5.3)
POTASSIUM SERPL-SCNC: 4.2 MMOL/L — SIGNIFICANT CHANGE UP (ref 3.5–5.3)
PROT ?TM UR-MCNC: 301 MG/DL — SIGNIFICANT CHANGE UP
PROT ?TM UR-MCNC: 301 MG/DL — SIGNIFICANT CHANGE UP
PROT SERPL-MCNC: 5.6 G/DL — LOW (ref 6–8.3)
PROT SERPL-MCNC: 5.7 G/DL — LOW (ref 6–8.3)
PROT SERPL-MCNC: 5.8 G/DL — LOW (ref 6–8.3)
PROT SERPL-MCNC: 6.1 G/DL — SIGNIFICANT CHANGE UP (ref 6–8.3)
PROT UR-MCNC: ABNORMAL
PROT/CREAT UR-RTO: 1 RATIO — HIGH (ref 0–0.2)
PROTHROM AB SERPL-ACNC: 10 SEC — LOW (ref 10.5–13.4)
PROTHROM AB SERPL-ACNC: 10.1 SEC — LOW (ref 10.5–13.4)
PROTHROM AB SERPL-ACNC: 10.1 SEC — LOW (ref 10.5–13.4)
PROTHROM AB SERPL-ACNC: 10.7 SEC — SIGNIFICANT CHANGE UP (ref 10.5–13.4)
RBC # BLD: 2.74 M/UL — LOW (ref 3.8–5.2)
RBC # BLD: 3 M/UL — LOW (ref 3.8–5.2)
RBC # BLD: 3.08 M/UL — LOW (ref 3.8–5.2)
RBC # BLD: 3.19 M/UL — LOW (ref 3.8–5.2)
RBC # FLD: 14 % — SIGNIFICANT CHANGE UP (ref 10.3–14.5)
RBC # FLD: 14 % — SIGNIFICANT CHANGE UP (ref 10.3–14.5)
RBC # FLD: 14.2 % — SIGNIFICANT CHANGE UP (ref 10.3–14.5)
RBC # FLD: 14.3 % — SIGNIFICANT CHANGE UP (ref 10.3–14.5)
RBC CASTS # UR COMP ASSIST: 5 /HPF — HIGH (ref 0–4)
RH IG SCN BLD-IMP: POSITIVE — SIGNIFICANT CHANGE UP
SARS-COV-2 IGG+IGM SERPL QL IA: >250 U/ML — HIGH
SARS-COV-2 IGG+IGM SERPL QL IA: POSITIVE
SODIUM SERPL-SCNC: 128 MMOL/L — LOW (ref 135–145)
SODIUM SERPL-SCNC: 132 MMOL/L — LOW (ref 135–145)
SODIUM SERPL-SCNC: 134 MMOL/L — LOW (ref 135–145)
SODIUM SERPL-SCNC: 137 MMOL/L — SIGNIFICANT CHANGE UP (ref 135–145)
SP GR SPEC: 1.03 — SIGNIFICANT CHANGE UP (ref 1–1.05)
URATE SERPL-MCNC: 6.7 MG/DL — SIGNIFICANT CHANGE UP (ref 2.5–7)
URATE SERPL-MCNC: 6.8 MG/DL — SIGNIFICANT CHANGE UP (ref 2.5–7)
URATE SERPL-MCNC: 6.9 MG/DL — SIGNIFICANT CHANGE UP (ref 2.5–7)
URATE SERPL-MCNC: 7 MG/DL — SIGNIFICANT CHANGE UP (ref 2.5–7)
UROBILINOGEN FLD QL: ABNORMAL
WBC # BLD: 10.8 K/UL — HIGH (ref 3.8–10.5)
WBC # BLD: 11.09 K/UL — HIGH (ref 3.8–10.5)
WBC # BLD: 8.67 K/UL — SIGNIFICANT CHANGE UP (ref 3.8–10.5)
WBC # BLD: 8.72 K/UL — SIGNIFICANT CHANGE UP (ref 3.8–10.5)
WBC # FLD AUTO: 10.8 K/UL — HIGH (ref 3.8–10.5)
WBC # FLD AUTO: 11.09 K/UL — HIGH (ref 3.8–10.5)
WBC # FLD AUTO: 8.67 K/UL — SIGNIFICANT CHANGE UP (ref 3.8–10.5)
WBC # FLD AUTO: 8.72 K/UL — SIGNIFICANT CHANGE UP (ref 3.8–10.5)
WBC UR QL: 23 /HPF — HIGH (ref 0–5)

## 2022-05-29 PROCEDURE — 88307 TISSUE EXAM BY PATHOLOGIST: CPT | Mod: 26

## 2022-05-29 PROCEDURE — 59400 OBSTETRICAL CARE: CPT | Mod: U7,UB,GC

## 2022-05-29 RX ORDER — KETOROLAC TROMETHAMINE 30 MG/ML
30 SYRINGE (ML) INJECTION ONCE
Refills: 0 | Status: DISCONTINUED | OUTPATIENT
Start: 2022-05-29 | End: 2022-05-29

## 2022-05-29 RX ORDER — IBUPROFEN 200 MG
600 TABLET ORAL EVERY 6 HOURS
Refills: 0 | Status: COMPLETED | OUTPATIENT
Start: 2022-05-29 | End: 2023-04-27

## 2022-05-29 RX ORDER — SIMETHICONE 80 MG/1
80 TABLET, CHEWABLE ORAL EVERY 4 HOURS
Refills: 0 | Status: DISCONTINUED | OUTPATIENT
Start: 2022-05-29 | End: 2022-06-01

## 2022-05-29 RX ORDER — SODIUM CHLORIDE 9 MG/ML
3 INJECTION INTRAMUSCULAR; INTRAVENOUS; SUBCUTANEOUS EVERY 8 HOURS
Refills: 0 | Status: DISCONTINUED | OUTPATIENT
Start: 2022-05-29 | End: 2022-06-01

## 2022-05-29 RX ORDER — ACETAMINOPHEN 500 MG
975 TABLET ORAL
Refills: 0 | Status: DISCONTINUED | OUTPATIENT
Start: 2022-05-29 | End: 2022-06-01

## 2022-05-29 RX ORDER — MAGNESIUM SULFATE 500 MG/ML
4 VIAL (ML) INJECTION ONCE
Refills: 0 | Status: COMPLETED | OUTPATIENT
Start: 2022-05-29 | End: 2022-05-29

## 2022-05-29 RX ORDER — TETANUS TOXOID, REDUCED DIPHTHERIA TOXOID AND ACELLULAR PERTUSSIS VACCINE, ADSORBED 5; 2.5; 8; 8; 2.5 [IU]/.5ML; [IU]/.5ML; UG/.5ML; UG/.5ML; UG/.5ML
0.5 SUSPENSION INTRAMUSCULAR ONCE
Refills: 0 | Status: COMPLETED | OUTPATIENT
Start: 2022-05-29

## 2022-05-29 RX ORDER — MAGNESIUM SULFATE 500 MG/ML
2 VIAL (ML) INJECTION
Qty: 40 | Refills: 0 | Status: DISCONTINUED | OUTPATIENT
Start: 2022-05-29 | End: 2022-05-29

## 2022-05-29 RX ORDER — SODIUM CHLORIDE 0.65 %
1 AEROSOL, SPRAY (ML) NASAL
Refills: 0 | Status: DISCONTINUED | OUTPATIENT
Start: 2022-05-29 | End: 2022-06-01

## 2022-05-29 RX ORDER — OXYTOCIN 10 UNIT/ML
VIAL (ML) INJECTION
Qty: 30 | Refills: 0 | Status: DISCONTINUED | OUTPATIENT
Start: 2022-05-29 | End: 2022-05-29

## 2022-05-29 RX ORDER — SODIUM CHLORIDE 0.65 %
1 AEROSOL, SPRAY (ML) NASAL
Refills: 0 | Status: DISCONTINUED | OUTPATIENT
Start: 2022-05-29 | End: 2022-05-29

## 2022-05-29 RX ORDER — AMPICILLIN TRIHYDRATE 250 MG
1 CAPSULE ORAL EVERY 4 HOURS
Refills: 0 | Status: DISCONTINUED | OUTPATIENT
Start: 2022-05-29 | End: 2022-05-29

## 2022-05-29 RX ORDER — OXYTOCIN 10 UNIT/ML
333.33 VIAL (ML) INJECTION
Qty: 20 | Refills: 0 | Status: DISCONTINUED | OUTPATIENT
Start: 2022-05-29 | End: 2022-05-31

## 2022-05-29 RX ORDER — NIFEDIPINE 30 MG
30 TABLET, EXTENDED RELEASE 24 HR ORAL EVERY 24 HOURS
Refills: 0 | Status: DISCONTINUED | OUTPATIENT
Start: 2022-05-29 | End: 2022-06-01

## 2022-05-29 RX ORDER — ACETAMINOPHEN 500 MG
1000 TABLET ORAL ONCE
Refills: 0 | Status: COMPLETED | OUTPATIENT
Start: 2022-05-29 | End: 2022-05-29

## 2022-05-29 RX ORDER — ACETAMINOPHEN 500 MG
975 TABLET ORAL ONCE
Refills: 0 | Status: COMPLETED | OUTPATIENT
Start: 2022-05-29 | End: 2022-05-29

## 2022-05-29 RX ORDER — ONDANSETRON 8 MG/1
4 TABLET, FILM COATED ORAL ONCE
Refills: 0 | Status: COMPLETED | OUTPATIENT
Start: 2022-05-29 | End: 2022-05-29

## 2022-05-29 RX ORDER — DIPHENOXYLATE HCL/ATROPINE 2.5-.025MG
2 TABLET ORAL ONCE
Refills: 0 | Status: DISCONTINUED | OUTPATIENT
Start: 2022-05-29 | End: 2022-05-29

## 2022-05-29 RX ORDER — DIPHENHYDRAMINE HCL 50 MG
25 CAPSULE ORAL EVERY 6 HOURS
Refills: 0 | Status: DISCONTINUED | OUTPATIENT
Start: 2022-05-29 | End: 2022-06-01

## 2022-05-29 RX ORDER — NIFEDIPINE 30 MG
30 TABLET, EXTENDED RELEASE 24 HR ORAL EVERY 24 HOURS
Refills: 0 | Status: DISCONTINUED | OUTPATIENT
Start: 2022-05-29 | End: 2022-05-29

## 2022-05-29 RX ORDER — LANOLIN
1 OINTMENT (GRAM) TOPICAL EVERY 6 HOURS
Refills: 0 | Status: DISCONTINUED | OUTPATIENT
Start: 2022-05-29 | End: 2022-06-01

## 2022-05-29 RX ORDER — SODIUM CHLORIDE 9 MG/ML
1000 INJECTION, SOLUTION INTRAVENOUS
Refills: 0 | Status: DISCONTINUED | OUTPATIENT
Start: 2022-05-29 | End: 2022-05-29

## 2022-05-29 RX ORDER — BENZOCAINE 10 %
1 GEL (GRAM) MUCOUS MEMBRANE EVERY 6 HOURS
Refills: 0 | Status: DISCONTINUED | OUTPATIENT
Start: 2022-05-29 | End: 2022-06-01

## 2022-05-29 RX ORDER — MAGNESIUM HYDROXIDE 400 MG/1
30 TABLET, CHEWABLE ORAL
Refills: 0 | Status: DISCONTINUED | OUTPATIENT
Start: 2022-05-29 | End: 2022-06-01

## 2022-05-29 RX ORDER — SODIUM CHLORIDE 9 MG/ML
1000 INJECTION, SOLUTION INTRAVENOUS
Refills: 0 | Status: DISCONTINUED | OUTPATIENT
Start: 2022-05-29 | End: 2022-05-31

## 2022-05-29 RX ORDER — HYDROCORTISONE 1 %
1 OINTMENT (GRAM) TOPICAL EVERY 6 HOURS
Refills: 0 | Status: DISCONTINUED | OUTPATIENT
Start: 2022-05-29 | End: 2022-06-01

## 2022-05-29 RX ORDER — PRAMOXINE HYDROCHLORIDE 150 MG/15G
1 AEROSOL, FOAM RECTAL EVERY 4 HOURS
Refills: 0 | Status: DISCONTINUED | OUTPATIENT
Start: 2022-05-29 | End: 2022-06-01

## 2022-05-29 RX ORDER — IBUPROFEN 200 MG
600 TABLET ORAL EVERY 6 HOURS
Refills: 0 | Status: DISCONTINUED | OUTPATIENT
Start: 2022-05-29 | End: 2022-06-01

## 2022-05-29 RX ORDER — DIBUCAINE 1 %
1 OINTMENT (GRAM) RECTAL EVERY 6 HOURS
Refills: 0 | Status: DISCONTINUED | OUTPATIENT
Start: 2022-05-29 | End: 2022-06-01

## 2022-05-29 RX ORDER — INFLUENZA VIRUS VACCINE 15; 15; 15; 15 UG/.5ML; UG/.5ML; UG/.5ML; UG/.5ML
0.5 SUSPENSION INTRAMUSCULAR ONCE
Refills: 0 | Status: DISCONTINUED | OUTPATIENT
Start: 2022-05-29 | End: 2022-06-01

## 2022-05-29 RX ORDER — NIFEDIPINE 30 MG
10 TABLET, EXTENDED RELEASE 24 HR ORAL ONCE
Refills: 0 | Status: COMPLETED | OUTPATIENT
Start: 2022-05-29 | End: 2022-05-29

## 2022-05-29 RX ORDER — AER TRAVELER 0.5 G/1
1 SOLUTION RECTAL; TOPICAL EVERY 4 HOURS
Refills: 0 | Status: DISCONTINUED | OUTPATIENT
Start: 2022-05-29 | End: 2022-06-01

## 2022-05-29 RX ORDER — SODIUM CHLORIDE 9 MG/ML
1000 INJECTION, SOLUTION INTRAVENOUS ONCE
Refills: 0 | Status: DISCONTINUED | OUTPATIENT
Start: 2022-05-29 | End: 2022-05-31

## 2022-05-29 RX ORDER — CARBOPROST TROMETHAMINE 250 UG/ML
250 INJECTION, SOLUTION INTRAMUSCULAR ONCE
Refills: 0 | Status: COMPLETED | OUTPATIENT
Start: 2022-05-29 | End: 2022-05-29

## 2022-05-29 RX ORDER — OXYTOCIN 10 UNIT/ML
20 VIAL (ML) INJECTION ONCE
Refills: 0 | Status: COMPLETED | OUTPATIENT
Start: 2022-05-29 | End: 2022-05-29

## 2022-05-29 RX ADMIN — Medication 50 GM/HR: at 07:11

## 2022-05-29 RX ADMIN — Medication 10 MILLIGRAM(S): at 01:25

## 2022-05-29 RX ADMIN — Medication 108 GRAM(S): at 11:33

## 2022-05-29 RX ADMIN — Medication 1000 MILLIGRAM(S): at 14:50

## 2022-05-29 RX ADMIN — SODIUM CHLORIDE 125 MILLILITER(S): 9 INJECTION, SOLUTION INTRAVENOUS at 00:19

## 2022-05-29 RX ADMIN — Medication 975 MILLIGRAM(S): at 07:26

## 2022-05-29 RX ADMIN — Medication 50 GM/HR: at 19:03

## 2022-05-29 RX ADMIN — Medication 30 MILLIGRAM(S): at 15:12

## 2022-05-29 RX ADMIN — Medication 50 GM/HR: at 22:26

## 2022-05-29 RX ADMIN — Medication 400 MILLIGRAM(S): at 14:12

## 2022-05-29 RX ADMIN — Medication 2 MILLIUNIT(S)/MIN: at 03:56

## 2022-05-29 RX ADMIN — Medication 30 MILLIGRAM(S): at 01:25

## 2022-05-29 RX ADMIN — Medication 216 GRAM(S): at 00:19

## 2022-05-29 RX ADMIN — Medication 50 GM/HR: at 16:56

## 2022-05-29 RX ADMIN — Medication 30 MILLIGRAM(S): at 15:30

## 2022-05-29 RX ADMIN — Medication 1000 MILLIUNIT(S)/MIN: at 14:42

## 2022-05-29 RX ADMIN — Medication 108 GRAM(S): at 04:05

## 2022-05-29 RX ADMIN — Medication 2 TABLET(S): at 13:40

## 2022-05-29 RX ADMIN — Medication 50 GM/HR: at 01:47

## 2022-05-29 RX ADMIN — Medication 108 GRAM(S): at 07:35

## 2022-05-29 RX ADMIN — Medication 1 SPRAY(S): at 10:01

## 2022-05-29 RX ADMIN — Medication 2 MILLIUNIT(S)/MIN: at 07:26

## 2022-05-29 RX ADMIN — Medication 20 UNIT(S): at 13:26

## 2022-05-29 RX ADMIN — SODIUM CHLORIDE 50 MILLILITER(S): 9 INJECTION, SOLUTION INTRAVENOUS at 19:03

## 2022-05-29 RX ADMIN — Medication 975 MILLIGRAM(S): at 05:53

## 2022-05-29 RX ADMIN — CARBOPROST TROMETHAMINE 250 MICROGRAM(S): 250 INJECTION, SOLUTION INTRAMUSCULAR at 14:31

## 2022-05-29 RX ADMIN — Medication 300 GRAM(S): at 01:23

## 2022-05-29 RX ADMIN — ONDANSETRON 4 MILLIGRAM(S): 8 TABLET, FILM COATED ORAL at 11:01

## 2022-05-29 NOTE — OB PROVIDER DELIVERY SUMMARY - NSSELHIDDEN_OBGYN_ALL_OB_FT
[NS_DeliveryAttending1_OBGYN_ALL_OB_FT:[id number]],[NS_DeliveryAssist1_OBGYN_ALL_OB_FT:[id number]],[NS_DeliveryAssist2_OBGYN_ALL_OB_FT:[id number]],[NS_DeliveryRN_OBGYN_ALL_OB_FT:[id number]]

## 2022-05-29 NOTE — OB NEONATOLOGY/PEDIATRICIAN DELIVERY SUMMARY - NS_GESTAGEATBIRTHB_OBGYN_ALL_OB_FT
34w6d Ilumya Pregnancy And Lactation Text: The risk during pregnancy and breastfeeding is uncertain with this medication.

## 2022-05-29 NOTE — OB NEONATOLOGY/PEDIATRICIAN DELIVERY SUMMARY - NSPEDSNEONOTESB_OBGYN_ALL_OB_FT
34.6 wk twin infant born to a 36 y.o. , B+/GBS unknown (amp x4), all other PNL unremarkable. MatHx: enlarged thyroid, mitral valve prolapse, hx breast reduction sx (), surgical removal of dermoid cyst, LORENZO (), GERD.  OBHx:  (2018).  Pregnancy complicated by Caity twins, and IUGR on Twin B with elevated dopplers, PEC S/P Mg and procardia, COVID (2022)-s/p monoclonal antibodies.  Presented on  with SROM from 1400 with clear fluid, IOL due to IUGR. Delivered via , nuchal x1, W/D/S/S, CPAP started at 1 MOL WOB, fiO2 21%.  Apgars 7/9, infant sent to NICU on CPAP5, 21%.

## 2022-05-29 NOTE — OB POSTPARTUM EVENT NOTE - NS_EVENTSUMMARY1_OBGYN_ALL_OB_FT
patient is s/p TIUP  from 1306 and 1315.  Patient with QBL of 1329.  S/p KIRSTY that was removed at 1700

## 2022-05-29 NOTE — OB PROVIDER H&P - DOMESTIC TRAVEL HIGH RISK QUESTION
Your evaluation today revealed you to have bilateral kidney stones which are not the cause of your pain or discomfort  These are located in your kidney themselves and are not causing any issue today  Of note is that he will also have diverticulosis without any evidence of inflammation which again is not the cause your discomfort  Your symptoms were likely related to some transient diarrheal illness and at this time it is safe for you to be discharged home  Should symptoms get worse please return to the emergency room  Please follow-up with your family doctor  Thank you for choosing the emergency department at [facility], [place], operated by [facility]  We appreciated the opportunity and privilege to address your healthcare needs  We remain available to you should you require additional evaluation or assistance  We value your feedback and would appreciate the opportunity to address anything you identified as an opportunity to improve or where we excelled  If there are colleagues who deserve special recognition, please let us know! We hope you are feeling better soon!
No

## 2022-05-29 NOTE — OB PROVIDER H&P - ASSESSMENT
37 yo  @ 34.5 wks, di di twins c/o LOF since 2pm clear fluid, then felt a few gushes still leaking, denies vb, + GFM x 2. AP course complicated by baby B IUGR elevated dopplers. PEC, covid in 3/2022 s/p monoclonal antibodies denies fever chills ha n/v new swelling vision changes epigastric pain cp sob or cough. last saw OB and high risk . next appointment . scheduled for delivery 22.    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

## 2022-05-29 NOTE — OB NEONATOLOGY/PEDIATRICIAN DELIVERY SUMMARY - NSPEDSNEONOTESA_OBGYN_ALL_OB_FT
4.6 wk twin infant born to a 36 y.o. , B+/GBS unknown (amp x4), all other PNL unremarkable. MatHx: enlarged thyroid, mitral valve prolapse, hx breast reduction sx (), surgical removal of dermoid cyst, LORENZO (), GERD.  OBHx:  (2018).  Pregnancy complicated by Caity twins, and IUGR on Twin B with elevated dopplers, PEC S/P Mg and procardia, COVID (2022)-s/p monoclonal antibodies.  Presented on  with SROM from 1400 with clear fluid, IOL due to IUGR. Delivered via , W/D/S/S, Apgars 9/9, infant sent to NICU on room air due to prematurity

## 2022-05-29 NOTE — OB POSTPARTUM EVENT NOTE - NS_EVENTFINDINGS1_OBGYN_ALL_OB_FT
While standing, patient experienced ringing in her ears.  She sat down and drank juice.  Issue resolved.  Patient then ambulated to bathroom and voided 200cc.    Reviewed orthos with Dr. Giles.  Plan to PO hydrate and eat dinner.  Will repeat orthos as per MD Giles.    Routine Mag level and CBC to be drawn at 2030.

## 2022-05-29 NOTE — OB RN DELIVERY SUMMARY - NSSELHIDDEN_OBGYN_ALL_OB_FT
[NS_DeliveryAttending1_OBGYN_ALL_OB_FT:[id number]==],[NS_DeliveryAssist1_OBGYN_ALL_OB_FT:[id number]],[NS_DeliveryAssist2_OBGYN_ALL_OB_FT:[id number]],[NS_DeliveryRN_OBGYN_ALL_OB_FT:[id number]]

## 2022-05-29 NOTE — OB PROVIDER DELIVERY SUMMARY - NSPROVIDERDELIVERYNOTE_OBGYN_ALL_OB_FT
of BABY A in OR. Head, shoulders and body delivered easily in OP position. Delayed cord clamping performed, infant passed to NICU team. Pt reexamined, Baby B noted to be vertex. AROM performed. Patient pushed, with  of Baby B. Delayed cord clamping performed, infant passed to NICU team. Each placenta delivered intact. Vaginal exam revealed intact cervix, vaginal walls, sulci. There were no lacerations. Uterine atony was noted. Bimanual exam performed, continued intermittent atony despite Hemabate IM, Cytotec AK. KIRSTY placed, set to suction. Fundus noted to remain firm, no bleeding around KIRSTY device. Excellent hemostasis. Count was correct x2.    QBL: 1329     M.Symone, PGY-3  of BABY A in OR. Head, shoulders and body delivered easily in OP position. Delayed cord clamping performed, infant passed to NICU team. Pt reexamined, Baby B noted to be vertex. AROM performed. Patient pushed, with  of Baby B in OP position. Delayed cord clamping performed, infant passed to NICU team. Each placenta delivered intact. Vaginal exam revealed intact cervix, vaginal walls, sulci. There were no lacerations. Uterine atony was noted. Bimanual exam performed, continued intermittent atony despite Hemabate IM, Cytotec DC. KIRSTY placed, set to suction. Fundus noted to remain firm, no bleeding around KIRSTY device. Excellent hemostasis. Count was correct x2.    QBL: 1329     M.Symone, PGY-3

## 2022-05-29 NOTE — OB PROVIDER H&P - NSPPHRISKEVAL_OBGYN_ALL_OB
Over distended uterus (polyhydramnios, macrosomia, multiple gestation)/Prior , uterine surgery, or multiple laparotomies

## 2022-05-29 NOTE — OB RN PATIENT PROFILE - NUTRITION PROFILE
General Sunscreen Counseling: Wide brimmed hats and sunprotective clothing recommended.  I recommended a broad spectrum sunscreen with a SPF of 30 or higher in an adequate amount with reapplication every 2 hours or every 1 hour if sweating or in water.\\n\\nSigns and symptoms of melanoma and non-melanoma skin cancer reviewed.
Detail Level: Zone
No indicators present

## 2022-05-29 NOTE — OB PROVIDER H&P - NSICDXPASTMEDICALHX_GEN_ALL_CORE_FT
PAST MEDICAL HISTORY:  2019 novel coronavirus disease (COVID-19) 3/22, antibodies    Enlarged Thyroid yearly follow up    GERD (Gastroesophageal Reflux Disease)     MVP (Mitral Valve Prolapse)

## 2022-05-29 NOTE — CHART NOTE - NSCHARTNOTEFT_GEN_A_CORE
KIRSTY PLACEMENT    Time of Insertion: 13:30  Time of Removal:	    Duration of Device in Uterus:	     Amount of Saline Used to Fill Balloon:	60cc  Amount of Blood in Canister (After Attaching to Suction):	      Uterotonics used?	YES     	If YES, which?                   Dosage  	Cyotec           _____x_____   1000PR   	Pitocin           _____x_____    20u additional   	Hemabate      _____x_____    250mcg   	TXA               ___________  	Methergine    ___________      Need for transfusion? NO      Maribel Ashby PGY3

## 2022-05-29 NOTE — OB PROVIDER H&P - PROBLEM SELECTOR PLAN 1
d/w Dr Rao admit for PPROM di di twins @ 34.5 wks  Dr Rao and Dr Avila at bedside confirming fetal presentation vtx vtx  for Ampicillin for unknown GBS PPROM  for Pitocin for Augmentation for PPROM  prenatals reviewed  covid swab not sent 2/2 + covid <90 days ago s/p monoclonal antibodies  PRBC on hold  epidural for pain control  repeat VE exam  NPO

## 2022-05-29 NOTE — OB PROVIDER H&P - HISTORY OF PRESENT ILLNESS
35 yo  @ 34.5 wks, di di twins c/o LOF since 2pm clear fluid, then felt a few gushes still leaking, denies vb, + GFM x 2. AP course complicated by baby B IUGR elevated dopplers. PEC, covid in 3/2022 s/p monoclonal antibodies denies fever chills ha n/v new swelling vision changes epigastric pain cp sob or cough. last saw OB and high risk . next appointment . scheduled for delivery 22.    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

## 2022-05-29 NOTE — OB RN DELIVERY SUMMARY - NS_SEPSISRSKCALC_OBGYN_ALL_OB_FT
EOS calculated successfully. EOS Risk Factor: 0.5/1000 live births ([facility] national incidence); GA=34w6d; Temp=98.8; VFA=254.1; GBS='Unknown'; Antibiotics='GBS specific antibiotics > 2 hrs prior to birth'

## 2022-05-29 NOTE — OB RN PATIENT PROFILE - FALL HARM RISK - UNIVERSAL INTERVENTIONS
Bed in lowest position, wheels locked, appropriate side rails in place/Call bell, personal items and telephone in reach/Instruct patient to call for assistance before getting out of bed or chair/Non-slip footwear when patient is out of bed/[place] to call system/Physically safe environment - no spills, clutter or unnecessary equipment/Purposeful Proactive Rounding/Room/bathroom lighting operational, light cord in reach

## 2022-05-29 NOTE — OB PROVIDER H&P - NSHPPHYSICALEXAM_GEN_ALL_CORE
abd soft gravid NT  CVP RRR  LS clear bilaterally  TAS: baby A LLQ vertex  baby b RMQ vertex  SSE: + pooling clear fluid, + nitrazine + fern  SVE: 2/80/-3  FHT:   baby A baseline 130, + accelerations, negative decelerations  baby B baseline 140, + accelerations negative decelerations  toco: uterine irritability, pt reports abd cramping   Vital Signs Last 24 Hrs  T(C): 36.9 (28 May 2022 23:08), Max: 36.9 (28 May 2022 22:56)  T(F): 98.4 (28 May 2022 23:08), Max: 98.42 (28 May 2022 22:56)  HR: 83 (28 May 2022 23:56) (81 - 98)  BP: 160/94 (28 May 2022 23:56) (146/91 - 160/103)  BP(mean): --  RR: 17 (28 May 2022 23:08) (17 - 17)  SpO2: --

## 2022-05-30 ENCOUNTER — TRANSCRIPTION ENCOUNTER (OUTPATIENT)
Age: 37
End: 2022-05-30

## 2022-05-30 LAB
ALBUMIN SERPL ELPH-MCNC: 2.5 G/DL — LOW (ref 3.3–5)
ALP SERPL-CCNC: 159 U/L — HIGH (ref 40–120)
ALT FLD-CCNC: 8 U/L — SIGNIFICANT CHANGE UP (ref 4–33)
ANION GAP SERPL CALC-SCNC: 9 MMOL/L — SIGNIFICANT CHANGE UP (ref 7–14)
APTT BLD: 23.9 SEC — LOW (ref 27–36.3)
AST SERPL-CCNC: 22 U/L — SIGNIFICANT CHANGE UP (ref 4–32)
BASOPHILS # BLD AUTO: 0.01 K/UL — SIGNIFICANT CHANGE UP (ref 0–0.2)
BASOPHILS NFR BLD AUTO: 0.1 % — SIGNIFICANT CHANGE UP (ref 0–2)
BILIRUB SERPL-MCNC: 0.3 MG/DL — SIGNIFICANT CHANGE UP (ref 0.2–1.2)
BUN SERPL-MCNC: 13 MG/DL — SIGNIFICANT CHANGE UP (ref 7–23)
CALCIUM SERPL-MCNC: 6.9 MG/DL — LOW (ref 8.4–10.5)
CHLORIDE SERPL-SCNC: 104 MMOL/L — SIGNIFICANT CHANGE UP (ref 98–107)
CO2 SERPL-SCNC: 21 MMOL/L — LOW (ref 22–31)
CREAT SERPL-MCNC: 1.05 MG/DL — SIGNIFICANT CHANGE UP (ref 0.5–1.3)
EGFR: 71 ML/MIN/1.73M2 — SIGNIFICANT CHANGE UP
EOSINOPHIL # BLD AUTO: 0.03 K/UL — SIGNIFICANT CHANGE UP (ref 0–0.5)
EOSINOPHIL NFR BLD AUTO: 0.4 % — SIGNIFICANT CHANGE UP (ref 0–6)
FIBRINOGEN PPP-MCNC: 697 MG/DL — HIGH (ref 330–520)
GLUCOSE SERPL-MCNC: 106 MG/DL — HIGH (ref 70–99)
HCT VFR BLD CALC: 23.7 % — LOW (ref 34.5–45)
HGB BLD-MCNC: 7.8 G/DL — LOW (ref 11.5–15.5)
IANC: 6.19 K/UL — SIGNIFICANT CHANGE UP (ref 1.8–7.4)
IMM GRANULOCYTES NFR BLD AUTO: 0.7 % — SIGNIFICANT CHANGE UP (ref 0–1.5)
INR BLD: <0.9 RATIO — LOW (ref 0.88–1.16)
LDH SERPL L TO P-CCNC: 329 U/L — HIGH (ref 135–225)
LYMPHOCYTES # BLD AUTO: 0.76 K/UL — LOW (ref 1–3.3)
LYMPHOCYTES # BLD AUTO: 10.1 % — LOW (ref 13–44)
MAGNESIUM SERPL-MCNC: 5.7 MG/DL — HIGH (ref 1.6–2.6)
MAGNESIUM SERPL-MCNC: 5.8 MG/DL — HIGH (ref 1.6–2.6)
MAGNESIUM SERPL-MCNC: 6.8 MG/DL — HIGH (ref 1.6–2.6)
MCHC RBC-ENTMCNC: 32.9 GM/DL — SIGNIFICANT CHANGE UP (ref 32–36)
MCHC RBC-ENTMCNC: 32.9 PG — SIGNIFICANT CHANGE UP (ref 27–34)
MCV RBC AUTO: 100 FL — SIGNIFICANT CHANGE UP (ref 80–100)
MONOCYTES # BLD AUTO: 0.46 K/UL — SIGNIFICANT CHANGE UP (ref 0–0.9)
MONOCYTES NFR BLD AUTO: 6.1 % — SIGNIFICANT CHANGE UP (ref 2–14)
NEUTROPHILS # BLD AUTO: 6.19 K/UL — SIGNIFICANT CHANGE UP (ref 1.8–7.4)
NEUTROPHILS NFR BLD AUTO: 82.6 % — HIGH (ref 43–77)
NRBC # BLD: 0 /100 WBCS — SIGNIFICANT CHANGE UP
NRBC # FLD: 0 K/UL — SIGNIFICANT CHANGE UP
PLATELET # BLD AUTO: 138 K/UL — LOW (ref 150–400)
POTASSIUM SERPL-MCNC: 4.3 MMOL/L — SIGNIFICANT CHANGE UP (ref 3.5–5.3)
POTASSIUM SERPL-SCNC: 4.3 MMOL/L — SIGNIFICANT CHANGE UP (ref 3.5–5.3)
PROT SERPL-MCNC: 4.9 G/DL — LOW (ref 6–8.3)
PROTHROM AB SERPL-ACNC: 9.8 SEC — LOW (ref 10.5–13.4)
RBC # BLD: 2.37 M/UL — LOW (ref 3.8–5.2)
RBC # FLD: 14.7 % — HIGH (ref 10.3–14.5)
SODIUM SERPL-SCNC: 134 MMOL/L — LOW (ref 135–145)
T PALLIDUM AB TITR SER: NEGATIVE — SIGNIFICANT CHANGE UP
URATE SERPL-MCNC: 7.3 MG/DL — HIGH (ref 2.5–7)
WBC # BLD: 7.5 K/UL — SIGNIFICANT CHANGE UP (ref 3.8–10.5)
WBC # FLD AUTO: 7.5 K/UL — SIGNIFICANT CHANGE UP (ref 3.8–10.5)

## 2022-05-30 RX ORDER — ASPIRIN/CALCIUM CARB/MAGNESIUM 324 MG
1 TABLET ORAL
Qty: 0 | Refills: 0 | DISCHARGE

## 2022-05-30 RX ORDER — ACETAMINOPHEN 500 MG
3 TABLET ORAL
Qty: 0 | Refills: 0 | DISCHARGE
Start: 2022-05-30

## 2022-05-30 RX ORDER — MAGNESIUM SULFATE 500 MG/ML
1 VIAL (ML) INJECTION
Qty: 40 | Refills: 0 | Status: DISCONTINUED | OUTPATIENT
Start: 2022-05-30 | End: 2022-05-30

## 2022-05-30 RX ORDER — IBUPROFEN 200 MG
1 TABLET ORAL
Qty: 0 | Refills: 0 | DISCHARGE
Start: 2022-05-30

## 2022-05-30 RX ORDER — NIFEDIPINE 30 MG
1 TABLET, EXTENDED RELEASE 24 HR ORAL
Qty: 0 | Refills: 0 | DISCHARGE
Start: 2022-05-30

## 2022-05-30 RX ADMIN — Medication 25 GM/HR: at 07:22

## 2022-05-30 RX ADMIN — Medication 600 MILLIGRAM(S): at 06:19

## 2022-05-30 RX ADMIN — SIMETHICONE 80 MILLIGRAM(S): 80 TABLET, CHEWABLE ORAL at 12:08

## 2022-05-30 RX ADMIN — Medication 600 MILLIGRAM(S): at 17:37

## 2022-05-30 RX ADMIN — Medication 975 MILLIGRAM(S): at 09:02

## 2022-05-30 RX ADMIN — Medication 600 MILLIGRAM(S): at 13:00

## 2022-05-30 RX ADMIN — Medication 600 MILLIGRAM(S): at 18:30

## 2022-05-30 RX ADMIN — Medication 975 MILLIGRAM(S): at 21:48

## 2022-05-30 RX ADMIN — SODIUM CHLORIDE 3 MILLILITER(S): 9 INJECTION INTRAMUSCULAR; INTRAVENOUS; SUBCUTANEOUS at 06:22

## 2022-05-30 RX ADMIN — Medication 30 MILLIGRAM(S): at 00:24

## 2022-05-30 RX ADMIN — Medication 975 MILLIGRAM(S): at 21:18

## 2022-05-30 RX ADMIN — Medication 1 TABLET(S): at 12:08

## 2022-05-30 RX ADMIN — Medication 600 MILLIGRAM(S): at 12:08

## 2022-05-30 RX ADMIN — Medication 600 MILLIGRAM(S): at 00:24

## 2022-05-30 RX ADMIN — Medication 975 MILLIGRAM(S): at 02:36

## 2022-05-30 RX ADMIN — Medication 600 MILLIGRAM(S): at 01:15

## 2022-05-30 RX ADMIN — Medication 975 MILLIGRAM(S): at 03:30

## 2022-05-30 RX ADMIN — Medication 975 MILLIGRAM(S): at 10:00

## 2022-05-30 NOTE — DISCHARGE NOTE OB - HOSPITAL COURSE
Pt admitted with PPROM at 34wks with di-di TIUP.  She was induced and delivered viable female twins via . She was diagnosed with PEC with severe features and started on Magnesium sulfate and antihypertensive medication.  Uncomplicated PP course

## 2022-05-30 NOTE — PROVIDER CONTACT NOTE (OTHER) - ACTION/TREATMENT ORDERED:
OB Yoly Mtz stated to pause mag infusion Draw mag level at 02:30 when resulted will lower from 2grams to 1gram.

## 2022-05-30 NOTE — DISCHARGE NOTE OB - NS MD DC FALL RISK RISK
For information on Fall & Injury Prevention, visit: [email or web address] OR  [email or web address] OR  https://www.cdc.gov/steadi/patient.html

## 2022-05-30 NOTE — DISCHARGE NOTE OB - MEDICATION SUMMARY - MEDICATIONS TO TAKE
I will START or STAY ON the medications listed below when I get home from the hospital:    acetaminophen 325 mg oral tablet  -- 3 tab(s) by mouth every 8 hours, As Needed  -- Indication: For Vaginal delivery    ibuprofen 600 mg oral tablet  -- 1 tab(s) by mouth every 6 hours, As Needed  -- Indication: For Vaginal delivery    NIFEdipine 30 mg oral tablet, extended release  -- 1 tab(s) by mouth every 24 hours  -- Indication: For Preeclampsia    Prenatal Multivitamins with Folic Acid 1 mg oral tablet  -- 1 tab(s) by mouth once a day  -- Indication: For Vaginal delivery

## 2022-05-30 NOTE — DISCHARGE NOTE OB - CARE PROVIDER_API CALL
Negro Doherty (MD)  Obstetrics and Gynecology  [address] [place] [address]  Phone: [phone]  Fax: [phone]  Follow Up Time:

## 2022-05-30 NOTE — DISCHARGE NOTE OB - CARE PLAN
1 Principal Discharge DX:	Spontaneous vaginal delivery  Assessment and plan of treatment:	Nothing per vagina for 6wks  Secondary Diagnosis:	Preeclampsia, third trimester  Assessment and plan of treatment:	Blood pressure  control and check within 1 wk of discharge

## 2022-05-30 NOTE — PROVIDER CONTACT NOTE (OTHER) - SITUATION
Patient ambulated with RN to bathroom. Patient was ambulating back to bed with RN and felt dizzy and almost passed out.

## 2022-05-30 NOTE — DISCHARGE NOTE OB - PATIENT PORTAL LINK FT
You can access the FollowMyHealth Patient Portal offered by [facility] by registering at the following website: [email or web address]. By joining [facility]’s FollowMyHealth portal, you will also be able to view your health information using other applications (apps) compatible with our system.

## 2022-05-30 NOTE — PROVIDER CONTACT NOTE (OTHER) - ASSESSMENT
Patient ambulated with RN to bathroom. Patient was ambulating back to bed with RN and felt dizzy and almost passed out. Vital signs stable. Last mag level at 20:40 -6.80. Next mag check at 02:30. Patient safety back in bed. Alert and oriented x3. Fundus firm at umbilicus with light bleeding. Denies any shortness of breath, blurry vision. Only nausea.

## 2022-05-31 ENCOUNTER — APPOINTMENT (OUTPATIENT)
Dept: OBGYN | Facility: CLINIC | Age: 37
End: 2022-05-31

## 2022-05-31 ENCOUNTER — APPOINTMENT (OUTPATIENT)
Dept: ANTEPARTUM | Facility: CLINIC | Age: 37
End: 2022-05-31

## 2022-05-31 PROBLEM — U07.1 COVID-19: Chronic | Status: ACTIVE | Noted: 2022-05-28

## 2022-05-31 LAB
B-HEM STREP SPEC QL CULT: NORMAL
BASOPHILS # BLD AUTO: 0.01 K/UL — SIGNIFICANT CHANGE UP (ref 0–0.2)
BASOPHILS NFR BLD AUTO: 0.2 % — SIGNIFICANT CHANGE UP (ref 0–2)
EOSINOPHIL # BLD AUTO: 0.07 K/UL — SIGNIFICANT CHANGE UP (ref 0–0.5)
EOSINOPHIL NFR BLD AUTO: 1.2 % — SIGNIFICANT CHANGE UP (ref 0–6)
HCT VFR BLD CALC: 22.9 % — LOW (ref 34.5–45)
HGB BLD-MCNC: 7.4 G/DL — LOW (ref 11.5–15.5)
IANC: 4.15 K/UL — SIGNIFICANT CHANGE UP (ref 1.8–7.4)
IMM GRANULOCYTES NFR BLD AUTO: 1 % — SIGNIFICANT CHANGE UP (ref 0–1.5)
LYMPHOCYTES # BLD AUTO: 1.16 K/UL — SIGNIFICANT CHANGE UP (ref 1–3.3)
LYMPHOCYTES # BLD AUTO: 19.3 % — SIGNIFICANT CHANGE UP (ref 13–44)
MCHC RBC-ENTMCNC: 32.3 GM/DL — SIGNIFICANT CHANGE UP (ref 32–36)
MCHC RBC-ENTMCNC: 33 PG — SIGNIFICANT CHANGE UP (ref 27–34)
MCV RBC AUTO: 102.2 FL — HIGH (ref 80–100)
MONOCYTES # BLD AUTO: 0.55 K/UL — SIGNIFICANT CHANGE UP (ref 0–0.9)
MONOCYTES NFR BLD AUTO: 9.2 % — SIGNIFICANT CHANGE UP (ref 2–14)
NEUTROPHILS # BLD AUTO: 4.15 K/UL — SIGNIFICANT CHANGE UP (ref 1.8–7.4)
NEUTROPHILS NFR BLD AUTO: 69.1 % — SIGNIFICANT CHANGE UP (ref 43–77)
NRBC # BLD: 0 /100 WBCS — SIGNIFICANT CHANGE UP
NRBC # FLD: 0 K/UL — SIGNIFICANT CHANGE UP
PLATELET # BLD AUTO: 147 K/UL — LOW (ref 150–400)
RBC # BLD: 2.24 M/UL — LOW (ref 3.8–5.2)
RBC # FLD: 15.1 % — HIGH (ref 10.3–14.5)
WBC # BLD: 6 K/UL — SIGNIFICANT CHANGE UP (ref 3.8–10.5)
WBC # FLD AUTO: 6 K/UL — SIGNIFICANT CHANGE UP (ref 3.8–10.5)

## 2022-05-31 RX ORDER — FERROUS SULFATE 325(65) MG
325 TABLET ORAL THREE TIMES A DAY
Refills: 0 | Status: DISCONTINUED | OUTPATIENT
Start: 2022-05-31 | End: 2022-06-01

## 2022-05-31 RX ORDER — TETANUS TOXOID, REDUCED DIPHTHERIA TOXOID AND ACELLULAR PERTUSSIS VACCINE, ADSORBED 5; 2.5; 8; 8; 2.5 [IU]/.5ML; [IU]/.5ML; UG/.5ML; UG/.5ML; UG/.5ML
0.5 SUSPENSION INTRAMUSCULAR ONCE
Refills: 0 | Status: COMPLETED | OUTPATIENT
Start: 2022-05-31 | End: 2022-05-31

## 2022-05-31 RX ORDER — ASCORBIC ACID 60 MG
500 TABLET,CHEWABLE ORAL DAILY
Refills: 0 | Status: DISCONTINUED | OUTPATIENT
Start: 2022-05-31 | End: 2022-06-01

## 2022-05-31 RX ADMIN — Medication 975 MILLIGRAM(S): at 03:53

## 2022-05-31 RX ADMIN — TETANUS TOXOID, REDUCED DIPHTHERIA TOXOID AND ACELLULAR PERTUSSIS VACCINE, ADSORBED 0.5 MILLILITER(S): 5; 2.5; 8; 8; 2.5 SUSPENSION INTRAMUSCULAR at 07:08

## 2022-05-31 RX ADMIN — Medication 600 MILLIGRAM(S): at 12:33

## 2022-05-31 RX ADMIN — Medication 325 MILLIGRAM(S): at 21:33

## 2022-05-31 RX ADMIN — Medication 975 MILLIGRAM(S): at 03:23

## 2022-05-31 RX ADMIN — Medication 975 MILLIGRAM(S): at 10:40

## 2022-05-31 RX ADMIN — SODIUM CHLORIDE 3 MILLILITER(S): 9 INJECTION INTRAMUSCULAR; INTRAVENOUS; SUBCUTANEOUS at 22:05

## 2022-05-31 RX ADMIN — Medication 975 MILLIGRAM(S): at 09:48

## 2022-05-31 RX ADMIN — Medication 30 MILLIGRAM(S): at 01:33

## 2022-05-31 RX ADMIN — Medication 600 MILLIGRAM(S): at 13:30

## 2022-05-31 RX ADMIN — Medication 600 MILLIGRAM(S): at 07:07

## 2022-05-31 RX ADMIN — Medication 975 MILLIGRAM(S): at 21:33

## 2022-05-31 RX ADMIN — Medication 975 MILLIGRAM(S): at 22:05

## 2022-05-31 RX ADMIN — Medication 325 MILLIGRAM(S): at 07:13

## 2022-05-31 RX ADMIN — Medication 600 MILLIGRAM(S): at 07:37

## 2022-05-31 RX ADMIN — Medication 600 MILLIGRAM(S): at 17:56

## 2022-05-31 RX ADMIN — SODIUM CHLORIDE 3 MILLILITER(S): 9 INJECTION INTRAMUSCULAR; INTRAVENOUS; SUBCUTANEOUS at 14:00

## 2022-06-01 VITALS
RESPIRATION RATE: 17 BRPM | HEART RATE: 99 BPM | DIASTOLIC BLOOD PRESSURE: 87 MMHG | TEMPERATURE: 99 F | OXYGEN SATURATION: 98 % | SYSTOLIC BLOOD PRESSURE: 132 MMHG

## 2022-06-01 RX ADMIN — Medication 325 MILLIGRAM(S): at 05:44

## 2022-06-01 RX ADMIN — Medication 975 MILLIGRAM(S): at 02:54

## 2022-06-01 RX ADMIN — Medication 30 MILLIGRAM(S): at 02:32

## 2022-06-01 RX ADMIN — SODIUM CHLORIDE 3 MILLILITER(S): 9 INJECTION INTRAMUSCULAR; INTRAVENOUS; SUBCUTANEOUS at 05:44

## 2022-06-01 RX ADMIN — Medication 600 MILLIGRAM(S): at 11:04

## 2022-06-01 RX ADMIN — SIMETHICONE 80 MILLIGRAM(S): 80 TABLET, CHEWABLE ORAL at 02:32

## 2022-06-01 RX ADMIN — Medication 600 MILLIGRAM(S): at 11:40

## 2022-06-01 RX ADMIN — Medication 975 MILLIGRAM(S): at 03:30

## 2022-06-01 NOTE — PROGRESS NOTE ADULT - ASSESSMENT
A/P: 37yo PPD#1 s/p . Course c/b sPEC/Mg and elevated QBL. QBL 1329. Patient is stable and doing well post-partum.   #sPEC/Mg  - s/p Proc 10 IR  - c/w Proc 30 XL  - d/c Magnesium  at 1pm   - denies severe symptoms   - continue to monitor BP    #Elevated QBL   - VSS, continue to monitor   - f/u AM CBC   - s/p hemabate, pitocin, cytotec  - s/p KIRSTY    #PP Care  - Pain well controlled, continue current pain regimen  - Increase ambulation, SCDs when not ambulating  - Continue regular diet    Jenise Billingsley, PGY1
A/P: 37yo PPD#2 s/p .  Patient is stable and doing well post-partum.      sPEC/Mg  -s/p Procardia 10 IR  -c/w procardia 30, BP well controlled  -s/p Mg    QBL: 1329  -s/p KIRSTY, hemabate, pitocin, cytotec  -VSS, continue to monitor    postpartum  - Pain well controlled, continue current pain regimen  - Increase ambulation, SCDs when not ambulating  - Continue regular diet  - Discharge planning     Sharon Elizondo MD PGY1
A/P: 35yo PPD#3 s/p .  Patient is stable and doing well post-partum.      sPEC  -s/p Mg  -c/w Procardia 30  -BP well controlled overnight, continue to monitor    QBL: 1329  -s/p KIRSTY, hemabate, pitocin, cytotec SC  -vss stable, continue to monitor    post partum  - Pain well controlled, continue current pain regimen  - Increase ambulation, SCDs when not ambulating  - Continue regular diet  - Discharge planning     Sharon Elizondo MD PGY1

## 2022-06-01 NOTE — PROGRESS NOTE ADULT - SUBJECTIVE AND OBJECTIVE BOX
OB Progress Note:  PPD#2    S: 35yo PPD#2 s/p . Delivery c/b sPEC and elevated QBL (1329) s/p KIRSTY. Patient feels well. Pain is well controlled. She is tolerating a regular diet and passing flatus. She is voiding spontaneously, and ambulating without difficulty. Denies CP/SOB. Denies lightheadedness/dizziness. Denies N/V. Denies HA, changes in vision, RUQ pain, epigastric pain.    O:  Vitals:   Vital Signs Last 24 Hrs  T(C): 37.1 (31 May 2022 06:00), Max: 37.1 (31 May 2022 06:00)  T(F): 98.7 (31 May 2022 06:00), Max: 98.7 (31 May 2022 06:00)  HR: 80 (31 May 2022 06:00) (73 - 87)  BP: 133/73 (31 May 2022 06:00) (99/51 - 133/73)  BP(mean): --  RR: 17 (31 May 2022 06:00) (15 - 17)  SpO2: 99% (31 May 2022 06:00) (97% - 100%)    MEDICATIONS  (STANDING):  acetaminophen     Tablet .. 975 milliGRAM(s) Oral <User Schedule>  ascorbic acid 500 milliGRAM(s) Oral daily  ferrous    sulfate 325 milliGRAM(s) Oral three times a day  ibuprofen  Tablet. 600 milliGRAM(s) Oral every 6 hours  influenza   Vaccine 0.5 milliLiter(s) IntraMuscular once  misoprostol 1000 MICROGram(s) Oral once  NIFEdipine XL 30 milliGRAM(s) Oral every 24 hours  prenatal multivitamin 1 Tablet(s) Oral daily  sodium chloride 0.9% lock flush 3 milliLiter(s) IV Push every 8 hours    MEDICATIONS  (PRN):  benzocaine 20%/menthol 0.5% Spray 1 Spray(s) Topical every 6 hours PRN for Perineal discomfort  dibucaine 1% Ointment 1 Application(s) Topical every 6 hours PRN Perineal discomfort  diphenhydrAMINE 25 milliGRAM(s) Oral every 6 hours PRN Pruritus  hydrocortisone 1% Cream 1 Application(s) Topical every 6 hours PRN Moderate Pain (4-6)  lanolin Ointment 1 Application(s) Topical every 6 hours PRN nipple soreness  magnesium hydroxide Suspension 30 milliLiter(s) Oral two times a day PRN Constipation  pramoxine 1%/zinc 5% Cream 1 Application(s) Topical every 4 hours PRN Moderate Pain (4-6)  simethicone 80 milliGRAM(s) Chew every 4 hours PRN Gas  sodium chloride 0.65% Nasal 1 Spray(s) Both Nostrils four times a day PRN Nasal Congestion  witch hazel Pads 1 Application(s) Topical every 4 hours PRN Perineal discomfort      Labs:  Blood type: B Positive  Rubella IgG: RPR: Negative                          7.4<L>   6.00 >-----------< 147<L>    (  @ 06:09 )             22.9<L>                        7.8<L>   7.50 >-----------< 138<L>    (  @ 10:30 )             23.7<L>                        8.8<L>   11.09<H> >-----------< 178    (  @ 20:40 )             25.9<L>                        9.7<L>   10.80<H> >-----------< 165    (  @ 14:15 )             30.3<L>                        10.4<L>   8.67 >-----------< 160    (  @ 07:45 )             31.8<L>                        9.7<L>   8.72 >-----------< 168    (  @ 23:45 )             29.5<L>    22 @ 10:30      134<L>  |  104  |  13  ----------------------------<  106<H>  4.3   |  21<L>  |  1.05    22 @ 20:40      128<L>  |  98  |  12  ----------------------------<  162<H>  4.2   |  20<L>  |  0.92    05-29-22 @ 14:15      132<L>  |  102  |  14  ----------------------------<  106<H>  4.1   |  17<L>  |  0.93    22 @ 07:45      134<L>  |  102  |  14  ----------------------------<  83  4.1   |  18<L>  |  0.92    22 @ 23:45      137  |  105  |  16  ----------------------------<  70  4.1   |  19<L>  |  0.97        Ca    6.9<L>      30 May 2022 10:30  Ca    6.9<L>      29 May 2022 20:40  Ca    7.4<L>      29 May 2022 14:15  Ca    8.3<L>      29 May 2022 07:45  Ca    8.7      28 May 2022 23:45  Mg     5.70<H>     05-30  Mg     5.80<H>     05-30  Mg     6.80<H>     05-29  Mg     6.50<H>     05-29  Mg     5.70<H>     05-29    TPro  4.9<L>  /  Alb  2.5<L>  /  TBili  0.3  /  DBili  x   /  AST  22  /  ALT  8   /  AlkPhos  159<H>  22 @ 10:30  TPro  5.7<L>  /  Alb  3.0<L>  /  TBili  0.7  /  DBili  x   /  AST  29  /  ALT  8   /  AlkPhos  187<H>  22 @ 20:40  TPro  5.6<L>  /  Alb  2.9<L>  /  TBili  0.7  /  DBili  x   /  AST  26  /  ALT  5   /  AlkPhos  198<H>  22 @ 14:15  TPro  6.1  /  Alb  3.5  /  TBili  0.6  /  DBili  x   /  AST  25  /  ALT  8   /  AlkPhos  211<H>  22 @ 07:45  TPro  5.8<L>  /  Alb  3.2<L>  /  TBili  0.3  /  DBili  x   /  AST  24  /  ALT  10  /  AlkPhos  195<H>  22 @ 23:45          Physical Exam:  General: NAD  Abdomen: soft, non-tender, non-distended, fundus firm  Vaginal: Lochia wnl  Extremities: No erythema/edema    
Patient seen and examined at bedside, resting comfortably in no acute distress. Denies fever, chills, nausea or vomiting. She is tolerating a regular diet. She is ambulating without difficulty and voiding spontaneously. Pain is well controlled. Bleeding is less than a normal menses.    Physical Examination:  Vital Signs: Vital Signs Last 24 Hrs  T(C): 36.8 (30 May 2022 10:00), Max: 37.1 (29 May 2022 22:30)  T(F): 98.2 (30 May 2022 10:00), Max: 98.7 (29 May 2022 22:30)  HR: 84 (30 May 2022 10:00) (59 - 99)  BP: 99/51 (30 May 2022 10:00) (99/51 - 164/110)  BP(mean): --  RR: 16 (30 May 2022 10:00) (16 - 16)  SpO2: 100% (30 May 2022 10:00) (84% - 100%)  General: alert and oriented, no acute distress  Cardio: regular rate and rhythm  Respiratory: non labored respirations  Abdomen: soft, non-tender, non-distended, uterus firm, palpated below the umbilicus  VE: minimal lochia noted  Musculoskeletal: No erythema/edema, no calf tenderness bilaterally    MEDICATIONS  (STANDING):  acetaminophen     Tablet .. 975 milliGRAM(s) Oral <User Schedule>  diphtheria/tetanus/pertussis (acellular) Vaccine (ADAcel) 0.5 milliLiter(s) IntraMuscular once  ibuprofen  Tablet. 600 milliGRAM(s) Oral every 6 hours  influenza   Vaccine 0.5 milliLiter(s) IntraMuscular once  lactated ringers Bolus 1000 milliLiter(s) IV Bolus once  lactated ringers. 1000 milliLiter(s) (75 mL/Hr) IV Continuous <Continuous>  magnesium sulfate Infusion 1 Gm/Hr (25 mL/Hr) IV Continuous <Continuous>  misoprostol 1000 MICROGram(s) Oral once  NIFEdipine XL 30 milliGRAM(s) Oral every 24 hours  oxytocin Infusion 333.333 milliUNIT(s)/Min (1000 mL/Hr) IV Continuous <Continuous>  oxytocin Infusion 16.667 milliUNIT(s)/Min (50 mL/Hr) IV Continuous <Continuous>  prenatal multivitamin 1 Tablet(s) Oral daily  sodium chloride 0.9% lock flush 3 milliLiter(s) IV Push every 8 hours      Labs:  Blood type: B Positive  Rubella IgG: RPR: Negative                          8.8<L>   11.09<H> >-----------< 178    (  @ 20:40 )             25.9<L>                        9.7<L>   10.80<H> >-----------< 165    (  14:15 )             30.3<L>                        10.4<L>   8.67 >-----------< 160    (  @ 07:45 )             31.8<L>                        9.7<L>   8.72 >-----------< 168    (  @ 23:45 )             29.5<L>    22 @ 20:40      128<L>  |  98  |  12  ----------------------------<  162<H>  4.2   |  20<L>  |  0.92    22 @ 14:15      132<L>  |  102  |  14  ----------------------------<  106<H>  4.1   |  17<L>  |  0.93    22 @ 07:45      134<L>  |  102  |  14  ----------------------------<  83  4.1   |  18<L>  |  0.92    22 @ 23:45      137  |  105  |  16  ----------------------------<  70  4.1   |  19<L>  |  0.97        Ca    6.9<L>      29 May 2022 20:40  Ca    7.4<L>      29 May 2022 14:15  Ca    8.3<L>      29 May 2022 07:45  Ca    8.7      28 May 2022 23:45  Mg     5.80<H>     05-30  Mg     6.80<H>     05-29  Mg     6.50<H>     05-29  Mg     5.70<H>         TPro  5.7<L>  /  Alb  3.0<L>  /  TBili  0.7  /  DBili  x   /  AST  29  /  ALT  8   /  AlkPhos  187<H>  22 @ 20:40  TPro  5.6<L>  /  Alb  2.9<L>  /  TBili  0.7  /  DBili  x   /  AST  26  /  ALT  5   /  AlkPhos  198<H>  22 @ 14:15  TPro  6.1  /  Alb  3.5  /  TBili  0.6  /  DBili  x   /  AST  25  /  ALT  8   /  AlkPhos  211<H>  22 @ 07:45  TPro  5.8<L>  /  Alb  3.2<L>  /  TBili  0.3  /  DBili  x   /  AST  24  /  ALT  10  /  AlkPhos  195<H>  22 @ 23:45      Assessment and Plan:   37yo P3 s/p  of twins after PPROM on  now PPD#1 .  Patient is stable and doing well post-partum.   Blood type: B+    PEC with severe features  - On mag sulfate  - Procardia 30mg XL  - BPs 90-140s/60-80s    Postpartum hemorrhage  - QBL 1300cc  - s/p cytotec, hemabata, TXA, Rachel  - H/H stable    Plan:  - VS per unit protocol  - SCDs for DVT ppx  - Regular diet  - Pain well controlled, continue current pain regimen  - Encourage ambulation  - Continue to monitor BPs  - Discharge instructions reviewed  - D/c planning initiated, patient to follow up in office in 6 weeks for post partum visit    Fam Frausto MD
pt seen and evaluated by me stable for discharge, f/u in office in 6 weeks. 
OB Progress Note:  PPD#3    S: 37yo PPD#3 s/p . Delivery c/b sPEC/Mg.  Patient feels well. Pain is well controlled. She is tolerating a regular diet and passing flatus. She is voiding spontaneously, and ambulating without difficulty. Denies CP/SOB. Denies lightheadedness/dizziness. Denies N/V. Denies HA, changes in vision, RUQ pain, epigastric pain    O:  Vitals:   Vital Signs Last 24 Hrs  T(C): 36.7 (2022 05:49), Max: 37.2 (2022 02:30)  T(F): 98.1 (2022 05:49), Max: 98.9 (2022 02:30)  HR: 86 (2022 05:49) (83 - 99)  BP: 140/89 (2022 05:49) (104/90 - 142/81)  BP(mean): --  RR: 17 (2022 05:49) (16 - 17)  SpO2: 98% (2022 05:49) (96% - 100%)    MEDICATIONS  (STANDING):  acetaminophen     Tablet .. 975 milliGRAM(s) Oral <User Schedule>  ascorbic acid 500 milliGRAM(s) Oral daily  ferrous    sulfate 325 milliGRAM(s) Oral three times a day  ibuprofen  Tablet. 600 milliGRAM(s) Oral every 6 hours  influenza   Vaccine 0.5 milliLiter(s) IntraMuscular once  misoprostol 1000 MICROGram(s) Oral once  NIFEdipine XL 30 milliGRAM(s) Oral every 24 hours  prenatal multivitamin 1 Tablet(s) Oral daily  sodium chloride 0.9% lock flush 3 milliLiter(s) IV Push every 8 hours    MEDICATIONS  (PRN):  benzocaine 20%/menthol 0.5% Spray 1 Spray(s) Topical every 6 hours PRN for Perineal discomfort  dibucaine 1% Ointment 1 Application(s) Topical every 6 hours PRN Perineal discomfort  diphenhydrAMINE 25 milliGRAM(s) Oral every 6 hours PRN Pruritus  hydrocortisone 1% Cream 1 Application(s) Topical every 6 hours PRN Moderate Pain (4-6)  lanolin Ointment 1 Application(s) Topical every 6 hours PRN nipple soreness  magnesium hydroxide Suspension 30 milliLiter(s) Oral two times a day PRN Constipation  pramoxine 1%/zinc 5% Cream 1 Application(s) Topical every 4 hours PRN Moderate Pain (4-6)  simethicone 80 milliGRAM(s) Chew every 4 hours PRN Gas  sodium chloride 0.65% Nasal 1 Spray(s) Both Nostrils four times a day PRN Nasal Congestion  witch hazel Pads 1 Application(s) Topical every 4 hours PRN Perineal discomfort      Labs:  Blood type: B Positive  Rubella IgG: RPR: Negative                          7.4<L>   6.00 >-----------< 147<L>    (  @ 06:09 )             22.9<L>                        7.8<L>   7.50 >-----------< 138<L>    (  @ 10:30 )             23.7<L>                        8.8<L>   11.09<H> >-----------< 178    (  @ 20:40 )             25.9<L>                        9.7<L>   10.80<H> >-----------< 165    (  @ 14:15 )             30.3<L>    22 @ 10:30      134<L>  |  104  |  13  ----------------------------<  106<H>  4.3   |  21<L>  |  1.05    22 @ 20:40      128<L>  |  98  |  12  ----------------------------<  162<H>  4.2   |  20<L>  |  0.92    22 @ 14:15      132<L>  |  102  |  14  ----------------------------<  106<H>  4.1   |  17<L>  |  0.93        Ca    6.9<L>      30 May 2022 10:30  Ca    6.9<L>      29 May 2022 20:40  Ca    7.4<L>      29 May 2022 14:15  Mg     5.70<H>       Mg     5.80<H>       Mg     6.80<H>       Mg     6.50<H>         TPro  4.9<L>  /  Alb  2.5<L>  /  TBili  0.3  /  DBili  x   /  AST  22  /  ALT  8   /  AlkPhos  159<H>  22 @ 10:30  TPro  5.7<L>  /  Alb  3.0<L>  /  TBili  0.7  /  DBili  x   /  AST  29  /  ALT  8   /  AlkPhos  187<H>  22 @ 20:40  TPro  5.6<L>  /  Alb  2.9<L>  /  TBili  0.7  /  DBili  x   /  AST  26  /  ALT  5   /  AlkPhos  198<H>  22 @ 14:15          Physical Exam:  General: NAD  Abdomen: soft, non-tender, non-distended, fundus firm  Vaginal: Lochia wnl  Extremities: No erythema/edema    
OB Progress Note:  PPD#1    S: 35yo  PPD#1 s/p . Course c/b sPEC/Mg and elevated QBL. Patient feels well. Pain is well controlled, tolerating regular diet, passing flatus, voiding spontaneously, ambulating without difficulty. Denies heavy vaginal bleeding, CP/SOB, N/V, lightheadedness/dizziness.     O:  Vitals:  Vital Signs Last 24 Hrs  T(C): 37 (30 May 2022 02:30), Max: 37.1 (29 May 2022 22:30)  T(F): 98.6 (30 May 2022 02:30), Max: 98.7 (29 May 2022 22:30)  HR: 88 (30 May 2022 02:30) (59 - 119)  BP: 122/81 (30 May 2022 02:30) (104/64 - 164/110)  BP(mean): --  RR: 16 (30 May 2022 02:30) (16 - 16)  SpO2: 98% (30 May 2022 02:30) (84% - 100%)    MEDICATIONS  (STANDING):  acetaminophen     Tablet .. 975 milliGRAM(s) Oral <User Schedule>  diphtheria/tetanus/pertussis (acellular) Vaccine (ADAcel) 0.5 milliLiter(s) IntraMuscular once  ibuprofen  Tablet. 600 milliGRAM(s) Oral every 6 hours  influenza   Vaccine 0.5 milliLiter(s) IntraMuscular once  lactated ringers Bolus 1000 milliLiter(s) IV Bolus once  lactated ringers. 1000 milliLiter(s) (50 mL/Hr) IV Continuous <Continuous>  misoprostol 1000 MICROGram(s) Oral once  NIFEdipine XL 30 milliGRAM(s) Oral every 24 hours  oxytocin Infusion 333.333 milliUNIT(s)/Min (1000 mL/Hr) IV Continuous <Continuous>  oxytocin Infusion 16.667 milliUNIT(s)/Min (50 mL/Hr) IV Continuous <Continuous>  prenatal multivitamin 1 Tablet(s) Oral daily  sodium chloride 0.9% lock flush 3 milliLiter(s) IV Push every 8 hours      Labs:  Blood type: B Positive  Rubella IgG: RPR: Negative                          8.8<L>   11.09<H> >-----------< 178    (  @ 20:40 )             25.9<L>                        9.7<L>   10.80<H> >-----------< 165    (  @ 14:15 )             30.3<L>                        10.4<L>   8.67 >-----------< 160    (  @ 07:45 )             31.8<L>                        9.7<L>   8.72 >-----------< 168    (  @ 23:45 )             29.5<L>    22 @ 20:40      128<L>  |  98  |  12  ----------------------------<  162<H>  4.2   |  20<L>  |  0.92    22 @ 14:15      132<L>  |  102  |  14  ----------------------------<  106<H>  4.1   |  17<L>  |  0.93    22 @ 07:45      134<L>  |  102  |  14  ----------------------------<  83  4.1   |  18<L>  |  0.92    22 @ 23:45      137  |  105  |  16  ----------------------------<  70  4.1   |  19<L>  |  0.97        Ca    6.9<L>      29 May 2022 20:40  Ca    7.4<L>      29 May 2022 14:15  Ca    8.3<L>      29 May 2022 07:45  Ca    8.7      28 May 2022 23:45  Mg     5.80<H>     05-30  Mg     6.80<H>     05  Mg     6.50<H>     05-29  Mg     5.70<H>     05-29    TPro  5.7<L>  /  Alb  3.0<L>  /  TBili  0.7  /  DBili  x   /  AST  29  /  ALT  8   /  AlkPhos  187<H>  05-29-22 @ 20:40  TPro  5.6<L>  /  Alb  2.9<L>  /  TBili  0.7  /  DBili  x   /  AST  26  /  ALT  5   /  AlkPhos  198<H>  22 @ 14:15  TPro  6.1  /  Alb  3.5  /  TBili  0.6  /  DBili  x   /  AST  25  /  ALT  8   /  AlkPhos  211<H>  22 @ 07:45  TPro  5.8<L>  /  Alb  3.2<L>  /  TBili  0.3  /  DBili  x   /  AST  24  /  ALT  10  /  AlkPhos  195<H>  22 @ 23:45          Physical Exam:  General: NAD  Abdomen: soft, non-tender, non-distended, fundus firm  Vaginal: No heavy vaginal bleeding  Extremities: No erythema/edema

## 2022-06-02 ENCOUNTER — NON-APPOINTMENT (OUTPATIENT)
Age: 37
End: 2022-06-02

## 2022-06-02 RX ORDER — ONDANSETRON 8 MG/1
8 TABLET ORAL 3 TIMES DAILY
Qty: 90 | Refills: 3 | Status: DISCONTINUED | COMMUNITY
Start: 2021-12-22 | End: 2022-06-02

## 2022-06-03 ENCOUNTER — APPOINTMENT (OUTPATIENT)
Dept: ANTEPARTUM | Facility: CLINIC | Age: 37
End: 2022-06-03

## 2022-06-03 ENCOUNTER — NON-APPOINTMENT (OUTPATIENT)
Age: 37
End: 2022-06-03

## 2022-06-07 ENCOUNTER — NON-APPOINTMENT (OUTPATIENT)
Age: 37
End: 2022-06-07

## 2022-06-08 ENCOUNTER — APPOINTMENT (OUTPATIENT)
Dept: OBGYN | Facility: HOSPITAL | Age: 37
End: 2022-06-08

## 2022-06-09 ENCOUNTER — NON-APPOINTMENT (OUTPATIENT)
Age: 37
End: 2022-06-09

## 2022-06-09 ENCOUNTER — APPOINTMENT (OUTPATIENT)
Dept: CARE COORDINATION | Facility: HOME HEALTH | Age: 37
End: 2022-06-09

## 2022-06-10 ENCOUNTER — NON-APPOINTMENT (OUTPATIENT)
Age: 37
End: 2022-06-10

## 2022-06-13 ENCOUNTER — NON-APPOINTMENT (OUTPATIENT)
Age: 37
End: 2022-06-13

## 2022-06-18 ENCOUNTER — TRANSCRIPTION ENCOUNTER (OUTPATIENT)
Age: 37
End: 2022-06-18

## 2022-06-20 ENCOUNTER — NON-APPOINTMENT (OUTPATIENT)
Age: 37
End: 2022-06-20

## 2022-08-11 ENCOUNTER — APPOINTMENT (OUTPATIENT)
Dept: OBGYN | Facility: CLINIC | Age: 37
End: 2022-08-11

## 2022-11-07 NOTE — OB PROVIDER DELIVERY SUMMARY - NS_DELIVERYATTENDING1_OBGYN_ALL_OB_FT
Silvia Rao MD Trilobed Flap Text: The defect edges were debeveled with a #15 scalpel blade.  Given the location of the defect and the proximity to free margins a trilobed flap was deemed most appropriate.  Using a sterile surgical marker, an appropriate trilobed flap drawn around the defect.    The area thus outlined was incised deep to adipose tissue with a #15 scalpel blade.  The skin margins were undermined to an appropriate distance in all directions utilizing iris scissors.

## 2023-01-13 NOTE — OB RN TRIAGE NOTE - NS_VISITREASON1_OBGYN_ALL_OB
Transition to Care : 60 y.o. WF with history of large cecal polyp and s/p partial colon resection in the past who is here for a screening colonoscopy. She did have a colonoscopy in 2017 which showed hyperplastic rectal polyps and post-surgical changes. She denies any abdominal pain, no n/v, no gib. She prefers to do a Miralax prep. She has history of anal fissures in the past.    Hypertensive Disorder

## 2024-02-15 NOTE — OB PROVIDER H&P - LABOR: BISHOP SCORE
[facility]  Colorectal Surgery  Progress Note    Patient Name: Edita Riley  MRN: [id number]  Admission Date: 2/6/2024  Hospital Length of Stay: 9 days  Attending Physician: Leslie Balbuena MD    Subjective:     Interval History: NAEO. Feeling well. +BM. Tolerating diet.     Post-Op Info:  Procedure(s) (LRB):  REVISION, ANASTOMOSIS, URETEROILEAL (N/A)  OPEN LAPAROTOMY, EXPLORATORY (N/A)  CLOSURE, ILEOSTOMY (N/A)  ANASTOMOSIS, INTESTINE - Ileocolic anastomosis (N/A)  EXCISION, SMALL INTESTINE  LYSIS, ADHESIONS  LYSIS, ADHESIONS   9 Days Post-Op      Medications:  Continuous Infusions:  Scheduled Meds:   acetaminophen  1,000 mg Oral Q8H    ertapenem (INVanz) IV (PEDS and ADULTS)  1 g Intravenous Q24H    heparin (porcine)  5,000 Units Subcutaneous Q8H    sodium chloride 0.9%  10 mL Intravenous Q6H     PRN Meds:   ibuprofen    LIDOcaine HCL 10 mg/ml (1%)    melatonin    ondansetron    oxyCODONE    prochlorperazine    sodium chloride 0.9%    sodium chloride 0.9%        Objective:     Vital Signs (Most Recent):  Temp: 97 °F (36.1 °C) (02/15/24 0735)  Pulse: 101 (02/15/24 0735)  Resp: 20 (02/15/24 0735)  BP: (!) 99/50 (02/15/24 0735)  SpO2: 100 % (02/15/24 0735) Vital Signs (24h Range):  Temp:  [97 °F (36.1 °C)-98.8 °F (37.1 °C)] 97 °F (36.1 °C)  Pulse:  [] 101  Resp:  [16-20] 20  SpO2:  [95 %-100 %] 100 %  BP: ()/(50-78) 99/50     Intake/Output - Last 3 Shifts         02/13 0700  02/14 0659 02/14 0700  02/15 0659 02/15 0700  02/16 0659    P.O.  545     Other       Total Intake(mL/kg)  545 (6.6)     Urine (mL/kg/hr) 3750 (1.9) 2225 (1.1)     Drains 0 2     Stool  0     Total Output 3750 2227     Net -3750 -1682            Stool Occurrence  2 x              Physical Exam     Constitutional:       General: She is not in acute distress.  Cardiovascular:      Rate and Rhythm: Normal rate.      Pulses: Normal pulses.   Pulmonary:      Effort: Pulmonary effort is normal. No respiratory distress.    Abdominal:      General: There is no distension. Inicisons c/d/I, drain minimal out, urostomy functional, preivous ileostomy site with scan drainage     Palpations: Abdomen is soft.      Tenderness:Minimally tender  Neurological:      General: No focal deficit present.      Mental Status: She is alert and oriented to person, place, and time.     Assessment/Plan:     Cervical cancer  Edita Riley is a 61 y.o. 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 No N/V.      Plan:  Tolerating solids  Having bowel function  Ambulate, IS  CRP trending up last two days. Would monitor one more day. If continues to trend up would scan tomorrow to evaluate for infectious source  Rest per primary          Bill Mercado MD  Colorectal Surgery  [facility]         7

## 2024-03-18 ENCOUNTER — APPOINTMENT (OUTPATIENT)
Dept: OBGYN | Facility: CLINIC | Age: 39
End: 2024-03-18
Payer: COMMERCIAL

## 2024-03-18 ENCOUNTER — ASOB RESULT (OUTPATIENT)
Age: 39
End: 2024-03-18

## 2024-03-18 VITALS
HEIGHT: 64 IN | DIASTOLIC BLOOD PRESSURE: 83 MMHG | BODY MASS INDEX: 31.07 KG/M2 | WEIGHT: 182 LBS | HEART RATE: 116 BPM | SYSTOLIC BLOOD PRESSURE: 120 MMHG

## 2024-03-18 DIAGNOSIS — Z01.419 ENCOUNTER FOR GYNECOLOGICAL EXAMINATION (GENERAL) (ROUTINE) W/OUT ABNORMAL FINDINGS: ICD-10-CM

## 2024-03-18 DIAGNOSIS — N84.1 POLYP OF CERVIX UTERI: ICD-10-CM

## 2024-03-18 PROCEDURE — 76817 TRANSVAGINAL US OBSTETRIC: CPT

## 2024-03-18 PROCEDURE — 99459 PELVIC EXAMINATION: CPT

## 2024-03-18 PROCEDURE — 99395 PREV VISIT EST AGE 18-39: CPT

## 2024-03-18 RX ORDER — VITAMIN A, VITAMIN C, VITAMIN D-3, VITAMIN E, VITAMIN B-1, VITAMIN B-2, NIACIN, VITAMIN B-6, CALCIUM, IRON, ZINC, COPPER 4000; 120; 400; 22; 1.84; 3; 20; 10; 1; 12; 200; 27; 25; 2 [IU]/1; MG/1; [IU]/1; MG/1; MG/1; MG/1; MG/1; MG/1; MG/1; UG/1; MG/1; MG/1; MG/1; MG/1
27-1 TABLET ORAL
Qty: 90 | Refills: 2 | Status: DISCONTINUED | COMMUNITY
Start: 2021-11-09 | End: 2024-03-18

## 2024-03-18 RX ORDER — VITAMIN A ACETATE, .BETA.-CAROTENE, ASCORBIC ACID, CHOLECALCIFEROL, .ALPHA.-TOCOPHEROL ACETATE, DL-, THIAMINE MONONITRATE, RIBOFLAVIN, NIACINAMIDE, PYRIDOXINE HYDROCHLORIDE, FOLIC ACID, CYANOCOBALAMIN, CALCIUM CARBONATE, FERROUS FUMARATE, ZINC OXIDE, CUPRIC OXIDE 3080; 920; 120; 400; 22; 1.84; 3; 20; 10; 1; 12; 200; 27; 25; 2 [IU]/1; [IU]/1; MG/1; [IU]/1; MG/1; MG/1; MG/1; MG/1; MG/1; MG/1; UG/1; MG/1; MG/1; MG/1; MG/1
27-1 TABLET, FILM COATED ORAL DAILY
Qty: 90 | Refills: 3 | Status: ACTIVE | COMMUNITY
Start: 2024-03-18 | End: 1900-01-01

## 2024-03-18 RX ORDER — NIFEDIPINE 30 MG/1
30 TABLET, EXTENDED RELEASE ORAL
Qty: 30 | Refills: 0 | Status: DISCONTINUED | COMMUNITY
Start: 2022-06-03 | End: 2024-03-18

## 2024-03-18 RX ORDER — IBUPROFEN 200 MG/1
200 TABLET ORAL
Refills: 0 | Status: DISCONTINUED | COMMUNITY
Start: 2022-06-02 | End: 2024-03-18

## 2024-03-18 RX ORDER — ACETAMINOPHEN 325 MG/1
325 TABLET ORAL
Refills: 0 | Status: DISCONTINUED | COMMUNITY
Start: 2022-06-02 | End: 2024-03-18

## 2024-03-18 RX ORDER — DOXYLAMINE SUCCINATE AND PYRIDOXINE HYDROCHLORIDE 10; 10 MG/1; MG/1
10-10 TABLET, DELAYED RELEASE ORAL
Qty: 30 | Refills: 3 | Status: ACTIVE | COMMUNITY
Start: 2024-03-18 | End: 1900-01-01

## 2024-03-18 NOTE — PHYSICAL EXAM
[Labia Majora] : normal [Labia Minora] : normal [Normal] : normal [Polyp ___ cm] : [unfilled] ~[facility] polyp [Retroversion] : retroverted [Enlarged ___ wks] : enlarged [unfilled] ~Uweeks [Uterine Adnexae] : normal

## 2024-03-18 NOTE — DISCUSSION/SUMMARY
[FreeTextEntry1] : A - Wwv        elderly multigravida        hx of LEEP        hx of PEC       Di/Di Twin gestaiion     Anxiety/Fibromyalgia  P- f/u 3 weeks for NT and NIPS       EDC by joe today is 10/21/24, P 1103       will start pt on Diclegis, PNV and ASA 81mg BID     booklet, letter and hospital info given     initial prenatal labs done  plus 24 hour urine and PIH labs.

## 2024-03-18 NOTE — HISTORY OF PRESENT ILLNESS
[FreeTextEntry1] : 38 y.o. P 1103   LMP 24, presents for annual exam and confirmation of pregnancy,, pt feels nauseous. ,  PMH - Fibromyalgia - stable. ,  anxiety,  on no meds. , PSHx -  laparoscopic ovarian cystectomy , dermoid on right, ,   reduction mammoplasty, ,   FH- Bladder ca - father, , Type 2 Diabetes both parents,  Thyroid ca - mother, ,  SH -negative, GYN hx - LEEP for BENNIE. right ovarian dermoid, OB hx -   x 1 ( cifuentes)- manual removal of placenta. .   - Twins at 34 weeks, complicated by preeclampsia ,   EGA by LMP is 8 w 3 d.

## 2024-03-19 LAB
ABO + RH PNL BLD: NORMAL
ALBUMIN SERPL ELPH-MCNC: 3.9 G/DL
ALP BLD-CCNC: 51 U/L
ALT SERPL-CCNC: 38 U/L
ANION GAP SERPL CALC-SCNC: 11 MMOL/L
AST SERPL-CCNC: 18 U/L
BASOPHILS # BLD AUTO: 0.02 K/UL
BASOPHILS NFR BLD AUTO: 0.3 %
BILIRUB SERPL-MCNC: 0.4 MG/DL
BLD GP AB SCN SERPL QL: NORMAL
BUN SERPL-MCNC: 10 MG/DL
CALCIUM SERPL-MCNC: 9 MG/DL
CHLORIDE SERPL-SCNC: 105 MMOL/L
CO2 SERPL-SCNC: 22 MMOL/L
CREAT SERPL-MCNC: 0.7 MG/DL
EGFR: 113 ML/MIN/1.73M2
EOSINOPHIL # BLD AUTO: 0.07 K/UL
EOSINOPHIL NFR BLD AUTO: 0.9 %
ESTIMATED AVERAGE GLUCOSE: 91 MG/DL
GLUCOSE SERPL-MCNC: 72 MG/DL
HBA1C MFR BLD HPLC: 4.8 %
HBV SURFACE AG SER QL: NONREACTIVE
HCT VFR BLD CALC: 37.7 %
HCV AB SER QL: NONREACTIVE
HCV S/CO RATIO: 0.1 S/CO
HGB BLD-MCNC: 12.5 G/DL
HIV1+2 AB SPEC QL IA.RAPID: NONREACTIVE
HPV HIGH+LOW RISK DNA PNL CVX: NOT DETECTED
IMM GRANULOCYTES NFR BLD AUTO: 0.1 %
LYMPHOCYTES # BLD AUTO: 1.42 K/UL
LYMPHOCYTES NFR BLD AUTO: 18.8 %
MAN DIFF?: NORMAL
MCHC RBC-ENTMCNC: 31.9 PG
MCHC RBC-ENTMCNC: 33.2 GM/DL
MCV RBC AUTO: 96.2 FL
MONOCYTES # BLD AUTO: 0.48 K/UL
MONOCYTES NFR BLD AUTO: 6.4 %
NEUTROPHILS # BLD AUTO: 5.54 K/UL
NEUTROPHILS NFR BLD AUTO: 73.5 %
PLATELET # BLD AUTO: 244 K/UL
POTASSIUM SERPL-SCNC: 3.9 MMOL/L
PROT SERPL-MCNC: 6.1 G/DL
RBC # BLD: 3.92 M/UL
RBC # FLD: 13 %
SODIUM SERPL-SCNC: 139 MMOL/L
T PALLIDUM AB SER QL IA: NEGATIVE
URATE SERPL-MCNC: 2.9 MG/DL
WBC # FLD AUTO: 7.54 K/UL

## 2024-03-20 LAB
B19V IGG SER QL IA: 5.69 INDEX
B19V IGG+IGM SER-IMP: NORMAL
B19V IGG+IGM SER-IMP: POSITIVE
B19V IGM FLD-ACNC: 0.25 INDEX
B19V IGM SER-ACNC: NEGATIVE
BACTERIA UR CULT: ABNORMAL
C TRACH RRNA SPEC QL NAA+PROBE: NOT DETECTED
MEV IGG FLD QL IA: >300 AU/ML
MEV IGG+IGM SER-IMP: POSITIVE
N GONORRHOEA RRNA SPEC QL NAA+PROBE: NOT DETECTED
RUBV IGG FLD-ACNC: 1.6 INDEX
RUBV IGG SER-IMP: POSITIVE
SOURCE AMPLIFICATION: NORMAL
VZV AB TITR SER: POSITIVE
VZV IGG SER IF-ACNC: 1117 INDEX

## 2024-03-26 LAB — CYTOLOGY CVX/VAG DOC THIN PREP: NORMAL

## 2024-03-28 DIAGNOSIS — O21.9 VOMITING OF PREGNANCY, UNSPECIFIED: ICD-10-CM

## 2024-03-29 RX ORDER — ONDANSETRON 8 MG/1
8 TABLET, ORALLY DISINTEGRATING ORAL
Qty: 30 | Refills: 0 | Status: ACTIVE | COMMUNITY
Start: 2024-03-28 | End: 1900-01-01

## 2024-04-10 ENCOUNTER — APPOINTMENT (OUTPATIENT)
Dept: ANTEPARTUM | Facility: CLINIC | Age: 39
End: 2024-04-10
Payer: COMMERCIAL

## 2024-04-10 ENCOUNTER — ASOB RESULT (OUTPATIENT)
Age: 39
End: 2024-04-10

## 2024-04-10 PROCEDURE — 76813 OB US NUCHAL MEAS 1 GEST: CPT

## 2024-04-10 PROCEDURE — 76801 OB US < 14 WKS SINGLE FETUS: CPT

## 2024-04-10 PROCEDURE — 76814 OB US NUCHAL MEAS ADD-ON: CPT | Mod: 59

## 2024-04-10 PROCEDURE — 76802 OB US < 14 WKS ADDL FETUS: CPT | Mod: 59

## 2024-04-16 ENCOUNTER — APPOINTMENT (OUTPATIENT)
Dept: OBGYN | Facility: CLINIC | Age: 39
End: 2024-04-16
Payer: COMMERCIAL

## 2024-04-16 VITALS
SYSTOLIC BLOOD PRESSURE: 111 MMHG | WEIGHT: 194 LBS | HEART RATE: 94 BPM | HEIGHT: 64 IN | DIASTOLIC BLOOD PRESSURE: 77 MMHG | BODY MASS INDEX: 33.12 KG/M2

## 2024-04-16 PROCEDURE — 99213 OFFICE O/P EST LOW 20 MIN: CPT

## 2024-04-16 PROCEDURE — 99459 PELVIC EXAMINATION: CPT

## 2024-04-16 RX ORDER — ONDANSETRON 8 MG/1
8 TABLET ORAL 3 TIMES DAILY
Qty: 30 | Refills: 3 | Status: ACTIVE | COMMUNITY
Start: 2024-04-16 | End: 1900-01-01

## 2024-04-16 NOTE — PHYSICAL EXAM
[Chaperone Present] : A chaperone was present in the examining room during all aspects of the physical examination [[id number]] : A chaperone was present during the pelvic exam. [FreeTextEntry2] : Laura

## 2024-05-03 ENCOUNTER — APPOINTMENT (OUTPATIENT)
Dept: OBGYN | Facility: CLINIC | Age: 39
End: 2024-05-03
Payer: COMMERCIAL

## 2024-05-03 PROCEDURE — 99211 OFF/OP EST MAY X REQ PHY/QHP: CPT

## 2024-05-08 ENCOUNTER — ASOB RESULT (OUTPATIENT)
Age: 39
End: 2024-05-08

## 2024-05-08 ENCOUNTER — APPOINTMENT (OUTPATIENT)
Dept: ANTEPARTUM | Facility: CLINIC | Age: 39
End: 2024-05-08

## 2024-05-08 PROCEDURE — 76805 OB US >/= 14 WKS SNGL FETUS: CPT | Mod: 59

## 2024-05-08 PROCEDURE — 76817 TRANSVAGINAL US OBSTETRIC: CPT

## 2024-05-08 PROCEDURE — 76810 OB US >/= 14 WKS ADDL FETUS: CPT | Mod: 59

## 2024-05-10 ENCOUNTER — NON-APPOINTMENT (OUTPATIENT)
Age: 39
End: 2024-05-10

## 2024-05-15 ENCOUNTER — APPOINTMENT (OUTPATIENT)
Dept: OBGYN | Facility: CLINIC | Age: 39
End: 2024-05-15
Payer: COMMERCIAL

## 2024-05-15 VITALS
HEART RATE: 86 BPM | SYSTOLIC BLOOD PRESSURE: 115 MMHG | BODY MASS INDEX: 34.15 KG/M2 | DIASTOLIC BLOOD PRESSURE: 81 MMHG | HEIGHT: 64 IN | WEIGHT: 200 LBS

## 2024-05-15 PROCEDURE — 0502F SUBSEQUENT PRENATAL CARE: CPT

## 2024-05-15 RX ORDER — PANTOPRAZOLE 20 MG/1
20 TABLET, DELAYED RELEASE ORAL DAILY
Qty: 30 | Refills: 2 | Status: ACTIVE | COMMUNITY
Start: 2024-05-15 | End: 1900-01-01

## 2024-05-22 ENCOUNTER — APPOINTMENT (OUTPATIENT)
Dept: ANTEPARTUM | Facility: CLINIC | Age: 39
End: 2024-05-22

## 2024-05-24 LAB
AFP MOM: 2.09
AFP VALUE: 70.2 NG/ML
ALPHA FETOPROTEIN SERUM COMMENT: NORMAL
ALPHA FETOPROTEIN SERUM INTERPRETATION: NORMAL
ALPHA FETOPROTEIN SERUM RESULTS: NORMAL
ALPHA FETOPROTEIN SERUM TEST RESULTS: NORMAL
GESTATIONAL AGE BASED ON: NORMAL
GESTATIONAL AGE ON COLLECTION DATE: 17.3 WEEKS
INSULIN DEP DIABETES: NO
MATERNAL AGE AT EDD AFP: 39.2 YR
MULTIPLE GESTATION: NORMAL
OSBR RISK 1 IN: 1413
RACE: NORMAL
WEIGHT AFP: 200 LBS

## 2024-06-06 ENCOUNTER — APPOINTMENT (OUTPATIENT)
Dept: ANTEPARTUM | Facility: CLINIC | Age: 39
End: 2024-06-06

## 2024-06-06 ENCOUNTER — ASOB RESULT (OUTPATIENT)
Age: 39
End: 2024-06-06

## 2024-06-06 PROCEDURE — 76817 TRANSVAGINAL US OBSTETRIC: CPT

## 2024-06-06 PROCEDURE — 76812 OB US DETAILED ADDL FETUS: CPT | Mod: 59

## 2024-06-06 PROCEDURE — 76811 OB US DETAILED SNGL FETUS: CPT | Mod: 59

## 2024-06-12 ENCOUNTER — APPOINTMENT (OUTPATIENT)
Dept: OBGYN | Facility: CLINIC | Age: 39
End: 2024-06-12
Payer: COMMERCIAL

## 2024-06-12 VITALS
BODY MASS INDEX: 35.34 KG/M2 | DIASTOLIC BLOOD PRESSURE: 66 MMHG | HEIGHT: 64 IN | WEIGHT: 207 LBS | HEART RATE: 94 BPM | SYSTOLIC BLOOD PRESSURE: 92 MMHG

## 2024-06-12 DIAGNOSIS — O09.529 SUPERVISION OF ELDERLY MULTIGRAVIDA, UNSPECIFIED TRIMESTER: ICD-10-CM

## 2024-06-12 DIAGNOSIS — O30.049 TWIN PREGNANCY, DICHORIONIC/DIAMNIOTIC, UNSPECIFIED TRIMESTER: ICD-10-CM

## 2024-06-12 PROCEDURE — 0502F SUBSEQUENT PRENATAL CARE: CPT

## 2024-07-10 ENCOUNTER — ASOB RESULT (OUTPATIENT)
Age: 39
End: 2024-07-10

## 2024-07-10 ENCOUNTER — APPOINTMENT (OUTPATIENT)
Dept: ANTEPARTUM | Facility: CLINIC | Age: 39
End: 2024-07-10
Payer: COMMERCIAL

## 2024-07-10 PROCEDURE — 76816 OB US FOLLOW-UP PER FETUS: CPT | Mod: 59

## 2024-07-12 ENCOUNTER — APPOINTMENT (OUTPATIENT)
Dept: OBGYN | Facility: CLINIC | Age: 39
End: 2024-07-12
Payer: COMMERCIAL

## 2024-07-12 VITALS
HEART RATE: 97 BPM | SYSTOLIC BLOOD PRESSURE: 119 MMHG | DIASTOLIC BLOOD PRESSURE: 79 MMHG | HEIGHT: 64 IN | WEIGHT: 213 LBS | BODY MASS INDEX: 36.37 KG/M2

## 2024-07-12 DIAGNOSIS — O30.049 TWIN PREGNANCY, DICHORIONIC/DIAMNIOTIC, UNSPECIFIED TRIMESTER: ICD-10-CM

## 2024-07-12 PROCEDURE — 0502F SUBSEQUENT PRENATAL CARE: CPT

## 2024-07-23 ENCOUNTER — APPOINTMENT (OUTPATIENT)
Dept: OBGYN | Facility: CLINIC | Age: 39
End: 2024-07-23

## 2024-07-31 ENCOUNTER — APPOINTMENT (OUTPATIENT)
Dept: OBGYN | Facility: CLINIC | Age: 39
End: 2024-07-31
Payer: COMMERCIAL

## 2024-07-31 ENCOUNTER — LABORATORY RESULT (OUTPATIENT)
Age: 39
End: 2024-07-31

## 2024-07-31 VITALS
WEIGHT: 219 LBS | BODY MASS INDEX: 37.39 KG/M2 | DIASTOLIC BLOOD PRESSURE: 86 MMHG | HEIGHT: 64 IN | SYSTOLIC BLOOD PRESSURE: 131 MMHG | HEART RATE: 55 BPM

## 2024-07-31 DIAGNOSIS — O30.049 TWIN PREGNANCY, DICHORIONIC/DIAMNIOTIC, UNSPECIFIED TRIMESTER: ICD-10-CM

## 2024-07-31 PROCEDURE — 0502F SUBSEQUENT PRENATAL CARE: CPT

## 2024-08-06 LAB
BASOPHILS # BLD AUTO: 0.02 K/UL
BASOPHILS NFR BLD AUTO: 0.2 %
EOSINOPHIL # BLD AUTO: 0.13 K/UL
EOSINOPHIL NFR BLD AUTO: 1.3 %
GLUCOSE 1H P 50 G GLC PO SERPL-MCNC: 99 MG/DL
HCT VFR BLD CALC: 34.4 %
HGB BLD-MCNC: 10.4 G/DL
IMM GRANULOCYTES NFR BLD AUTO: 0.5 %
LYMPHOCYTES # BLD AUTO: 1.91 K/UL
LYMPHOCYTES NFR BLD AUTO: 19.4 %
MAN DIFF?: NORMAL
MCHC RBC-ENTMCNC: 30.2 GM/DL
MCHC RBC-ENTMCNC: 32.8 PG
MCV RBC AUTO: 108.5 FL
MONOCYTES # BLD AUTO: 0.72 K/UL
MONOCYTES NFR BLD AUTO: 7.3 %
NEUTROPHILS # BLD AUTO: 7.02 K/UL
NEUTROPHILS NFR BLD AUTO: 71.3 %
PLATELET # BLD AUTO: 164 K/UL
RBC # BLD: 3.17 M/UL
RBC # FLD: 15.8 %
WBC # FLD AUTO: 9.85 K/UL

## 2024-08-07 ENCOUNTER — APPOINTMENT (OUTPATIENT)
Dept: ANTEPARTUM | Facility: CLINIC | Age: 39
End: 2024-08-07

## 2024-08-07 ENCOUNTER — ASOB RESULT (OUTPATIENT)
Age: 39
End: 2024-08-07

## 2024-08-07 PROCEDURE — 76816 OB US FOLLOW-UP PER FETUS: CPT | Mod: 59

## 2024-08-07 PROCEDURE — 76819 FETAL BIOPHYS PROFIL W/O NST: CPT | Mod: 59

## 2024-08-07 PROCEDURE — 76816 OB US FOLLOW-UP PER FETUS: CPT

## 2024-08-07 PROCEDURE — 76817 TRANSVAGINAL US OBSTETRIC: CPT

## 2024-08-07 PROCEDURE — 99213 OFFICE O/P EST LOW 20 MIN: CPT | Mod: 25

## 2024-08-14 ENCOUNTER — APPOINTMENT (OUTPATIENT)
Dept: OBGYN | Facility: CLINIC | Age: 39
End: 2024-08-14
Payer: COMMERCIAL

## 2024-08-14 VITALS
HEIGHT: 64 IN | BODY MASS INDEX: 37.73 KG/M2 | DIASTOLIC BLOOD PRESSURE: 70 MMHG | WEIGHT: 221 LBS | SYSTOLIC BLOOD PRESSURE: 105 MMHG | HEART RATE: 109 BPM

## 2024-08-14 DIAGNOSIS — O30.049 TWIN PREGNANCY, DICHORIONIC/DIAMNIOTIC, UNSPECIFIED TRIMESTER: ICD-10-CM

## 2024-08-14 PROCEDURE — 0502F SUBSEQUENT PRENATAL CARE: CPT

## 2024-08-28 ENCOUNTER — APPOINTMENT (OUTPATIENT)
Dept: OBGYN | Facility: CLINIC | Age: 39
End: 2024-08-28
Payer: COMMERCIAL

## 2024-08-28 ENCOUNTER — LABORATORY RESULT (OUTPATIENT)
Age: 39
End: 2024-08-28

## 2024-08-28 VITALS
WEIGHT: 230 LBS | BODY MASS INDEX: 39.27 KG/M2 | SYSTOLIC BLOOD PRESSURE: 147 MMHG | HEIGHT: 64 IN | HEART RATE: 102 BPM | DIASTOLIC BLOOD PRESSURE: 86 MMHG

## 2024-08-28 DIAGNOSIS — O30.049 TWIN PREGNANCY, DICHORIONIC/DIAMNIOTIC, UNSPECIFIED TRIMESTER: ICD-10-CM

## 2024-08-28 PROCEDURE — 0502F SUBSEQUENT PRENATAL CARE: CPT

## 2024-08-29 ENCOUNTER — ASOB RESULT (OUTPATIENT)
Age: 39
End: 2024-08-29

## 2024-08-29 ENCOUNTER — APPOINTMENT (OUTPATIENT)
Dept: ANTEPARTUM | Facility: CLINIC | Age: 39
End: 2024-08-29

## 2024-08-29 LAB
BASOPHILS # BLD AUTO: 0.02 K/UL
BASOPHILS NFR BLD AUTO: 0.2 %
EOSINOPHIL # BLD AUTO: 0.07 K/UL
EOSINOPHIL NFR BLD AUTO: 0.8 %
HCT VFR BLD CALC: 32.6 %
HGB BLD-MCNC: 9.7 G/DL
IMM GRANULOCYTES NFR BLD AUTO: 0.3 %
LYMPHOCYTES # BLD AUTO: 1.4 K/UL
LYMPHOCYTES NFR BLD AUTO: 16 %
MAN DIFF?: NORMAL
MCHC RBC-ENTMCNC: 29.8 GM/DL
MCHC RBC-ENTMCNC: 31.3 PG
MCV RBC AUTO: 105.2 FL
MONOCYTES # BLD AUTO: 0.49 K/UL
MONOCYTES NFR BLD AUTO: 5.6 %
NEUTROPHILS # BLD AUTO: 6.72 K/UL
NEUTROPHILS NFR BLD AUTO: 77.1 %
PLATELET # BLD AUTO: 153 K/UL
RBC # BLD: 3.1 M/UL
RBC # FLD: 15.3 %
WBC # FLD AUTO: 8.73 K/UL

## 2024-08-29 PROCEDURE — 76816 OB US FOLLOW-UP PER FETUS: CPT | Mod: 59

## 2024-08-29 PROCEDURE — 76819 FETAL BIOPHYS PROFIL W/O NST: CPT | Mod: 59

## 2024-08-30 LAB
ALBUMIN SERPL ELPH-MCNC: 3.5 G/DL
ALP BLD-CCNC: 134 U/L
ALT SERPL-CCNC: 6 U/L
ANION GAP SERPL CALC-SCNC: 15 MMOL/L
AST SERPL-CCNC: 16 U/L
BILIRUB SERPL-MCNC: 0.4 MG/DL
BUN SERPL-MCNC: 10 MG/DL
CALCIUM SERPL-MCNC: 8.6 MG/DL
CHLORIDE SERPL-SCNC: 103 MMOL/L
CO2 SERPL-SCNC: 20 MMOL/L
CREAT SERPL-MCNC: 0.79 MG/DL
EGFR: 98 ML/MIN/1.73M2
GLUCOSE SERPL-MCNC: 56 MG/DL
POTASSIUM SERPL-SCNC: 4 MMOL/L
PROT SERPL-MCNC: 5.8 G/DL
SODIUM SERPL-SCNC: 139 MMOL/L
URATE SERPL-MCNC: 4.9 MG/DL

## 2024-09-10 ENCOUNTER — APPOINTMENT (OUTPATIENT)
Dept: OBGYN | Facility: CLINIC | Age: 39
End: 2024-09-10
Payer: COMMERCIAL

## 2024-09-10 VITALS
SYSTOLIC BLOOD PRESSURE: 153 MMHG | BODY MASS INDEX: 40.29 KG/M2 | HEIGHT: 64 IN | WEIGHT: 236 LBS | DIASTOLIC BLOOD PRESSURE: 76 MMHG | HEART RATE: 92 BPM

## 2024-09-10 DIAGNOSIS — O09.529 SUPERVISION OF ELDERLY MULTIGRAVIDA, UNSPECIFIED TRIMESTER: ICD-10-CM

## 2024-09-10 DIAGNOSIS — Z64.1 PROBLEMS RELATED TO MULTIPARITY: ICD-10-CM

## 2024-09-10 DIAGNOSIS — O30.049 TWIN PREGNANCY, DICHORIONIC/DIAMNIOTIC, UNSPECIFIED TRIMESTER: ICD-10-CM

## 2024-09-10 PROCEDURE — 0502F SUBSEQUENT PRENATAL CARE: CPT

## 2024-09-24 ENCOUNTER — APPOINTMENT (OUTPATIENT)
Dept: ANTEPARTUM | Facility: CLINIC | Age: 39
End: 2024-09-24
Payer: COMMERCIAL

## 2024-09-24 ENCOUNTER — ASOB RESULT (OUTPATIENT)
Age: 39
End: 2024-09-24

## 2024-09-24 ENCOUNTER — NON-APPOINTMENT (OUTPATIENT)
Age: 39
End: 2024-09-24

## 2024-09-24 PROCEDURE — 99215 OFFICE O/P EST HI 40 MIN: CPT | Mod: 25

## 2024-09-24 PROCEDURE — 76816 OB US FOLLOW-UP PER FETUS: CPT | Mod: 59

## 2024-09-24 PROCEDURE — 76818 FETAL BIOPHYS PROFILE W/NST: CPT

## 2024-09-24 PROCEDURE — 36415 COLL VENOUS BLD VENIPUNCTURE: CPT

## 2024-09-25 ENCOUNTER — INPATIENT (INPATIENT)
Facility: HOSPITAL | Age: 39
LOS: 4 days | Discharge: HOME | End: 2024-09-30
Attending: OBSTETRICS & GYNECOLOGY | Admitting: OBSTETRICS & GYNECOLOGY
Payer: COMMERCIAL

## 2024-09-25 ENCOUNTER — TRANSCRIPTION ENCOUNTER (OUTPATIENT)
Age: 39
End: 2024-09-25

## 2024-09-25 ENCOUNTER — APPOINTMENT (OUTPATIENT)
Dept: OBGYN | Facility: CLINIC | Age: 39
End: 2024-09-25

## 2024-09-25 ENCOUNTER — NON-APPOINTMENT (OUTPATIENT)
Age: 39
End: 2024-09-25

## 2024-09-25 ENCOUNTER — APPOINTMENT (OUTPATIENT)
Dept: ANTEPARTUM | Facility: CLINIC | Age: 39
End: 2024-09-25

## 2024-09-25 VITALS
RESPIRATION RATE: 16 BRPM | TEMPERATURE: 98 F | DIASTOLIC BLOOD PRESSURE: 111 MMHG | SYSTOLIC BLOOD PRESSURE: 182 MMHG | HEART RATE: 126 BPM

## 2024-09-25 DIAGNOSIS — O26.899 OTHER SPECIFIED PREGNANCY RELATED CONDITIONS, UNSPECIFIED TRIMESTER: ICD-10-CM

## 2024-09-25 DIAGNOSIS — O14.13 SEVERE PRE-ECLAMPSIA, THIRD TRIMESTER: ICD-10-CM

## 2024-09-25 DIAGNOSIS — D27.9 BENIGN NEOPLASM OF UNSPECIFIED OVARY: Chronic | ICD-10-CM

## 2024-09-25 LAB
ALBUMIN SERPL ELPH-MCNC: 3.1 G/DL
ALBUMIN SERPL ELPH-MCNC: 3.1 G/DL — LOW (ref 3.3–5)
ALP BLD-CCNC: 182 U/L
ALP SERPL-CCNC: 182 U/L — HIGH (ref 40–120)
ALT FLD-CCNC: <5 U/L — SIGNIFICANT CHANGE UP (ref 4–33)
ALT SERPL-CCNC: 5 U/L
ANION GAP SERPL CALC-SCNC: 15 MMOL/L — HIGH (ref 7–14)
ANION GAP SERPL CALC-SCNC: 16 MMOL/L
APPEARANCE UR: ABNORMAL
AST SERPL-CCNC: 20 U/L
AST SERPL-CCNC: 20 U/L — SIGNIFICANT CHANGE UP (ref 4–32)
BACTERIA # UR AUTO: ABNORMAL /HPF
BASOPHILS # BLD AUTO: 0.03 K/UL — SIGNIFICANT CHANGE UP (ref 0–0.2)
BASOPHILS NFR BLD AUTO: 0.3 % — SIGNIFICANT CHANGE UP (ref 0–2)
BILIRUB SERPL-MCNC: 0.5 MG/DL
BILIRUB SERPL-MCNC: 0.8 MG/DL — SIGNIFICANT CHANGE UP (ref 0.2–1.2)
BILIRUB UR-MCNC: ABNORMAL
BUN SERPL-MCNC: 15 MG/DL
BUN SERPL-MCNC: 15 MG/DL — SIGNIFICANT CHANGE UP (ref 7–23)
CALCIUM SERPL-MCNC: 8.7 MG/DL
CALCIUM SERPL-MCNC: 8.7 MG/DL — SIGNIFICANT CHANGE UP (ref 8.4–10.5)
CAST: 382 /LPF — HIGH (ref 0–4)
CHLORIDE SERPL-SCNC: 103 MMOL/L
CHLORIDE SERPL-SCNC: 105 MMOL/L — SIGNIFICANT CHANGE UP (ref 98–107)
CO2 SERPL-SCNC: 16 MMOL/L — LOW (ref 22–31)
CO2 SERPL-SCNC: 18 MMOL/L
COLOR SPEC: ABNORMAL
CREAT ?TM UR-MCNC: 618 MG/DL — SIGNIFICANT CHANGE UP
CREAT SERPL-MCNC: 1.06 MG/DL
CREAT SERPL-MCNC: 1.16 MG/DL — SIGNIFICANT CHANGE UP (ref 0.5–1.3)
DIFF PNL FLD: ABNORMAL
EGFR: 62 ML/MIN/1.73M2 — SIGNIFICANT CHANGE UP
EGFR: 69 ML/MIN/1.73M2
EOSINOPHIL # BLD AUTO: 0.06 K/UL — SIGNIFICANT CHANGE UP (ref 0–0.5)
EOSINOPHIL NFR BLD AUTO: 0.6 % — SIGNIFICANT CHANGE UP (ref 0–6)
GLUCOSE SERPL-MCNC: 44 MG/DL
GLUCOSE SERPL-MCNC: 83 MG/DL — SIGNIFICANT CHANGE UP (ref 70–99)
GLUCOSE UR QL: 100 MG/DL
HCT VFR BLD CALC: 31.2 %
HCT VFR BLD CALC: 32 % — LOW (ref 34.5–45)
HGB BLD-MCNC: 10.3 G/DL — LOW (ref 11.5–15.5)
HGB BLD-MCNC: 9.5 G/DL
IANC: 7.92 K/UL — HIGH (ref 1.8–7.4)
IMM GRANULOCYTES NFR BLD AUTO: 0.5 % — SIGNIFICANT CHANGE UP (ref 0–0.9)
KETONES UR-MCNC: NEGATIVE MG/DL — SIGNIFICANT CHANGE UP
LDH SERPL L TO P-CCNC: 308 U/L — HIGH (ref 135–225)
LEUKOCYTE ESTERASE UR-ACNC: ABNORMAL
LYMPHOCYTES # BLD AUTO: 1.22 K/UL — SIGNIFICANT CHANGE UP (ref 1–3.3)
LYMPHOCYTES # BLD AUTO: 12.4 % — LOW (ref 13–44)
MCHC RBC-ENTMCNC: 30.2 PG
MCHC RBC-ENTMCNC: 30.4 GM/DL
MCHC RBC-ENTMCNC: 30.6 PG — SIGNIFICANT CHANGE UP (ref 27–34)
MCHC RBC-ENTMCNC: 32.2 GM/DL — SIGNIFICANT CHANGE UP (ref 32–36)
MCV RBC AUTO: 95 FL — SIGNIFICANT CHANGE UP (ref 80–100)
MCV RBC AUTO: 99 FL
MONOCYTES # BLD AUTO: 0.58 K/UL — SIGNIFICANT CHANGE UP (ref 0–0.9)
MONOCYTES NFR BLD AUTO: 5.9 % — SIGNIFICANT CHANGE UP (ref 2–14)
NEUTROPHILS # BLD AUTO: 7.92 K/UL — HIGH (ref 1.8–7.4)
NEUTROPHILS NFR BLD AUTO: 80.3 % — HIGH (ref 43–77)
NITRITE UR-MCNC: POSITIVE
NRBC # BLD: 0 /100 WBCS — SIGNIFICANT CHANGE UP (ref 0–0)
NRBC # FLD: 0.02 K/UL — HIGH (ref 0–0)
PH UR: 5.5 — SIGNIFICANT CHANGE UP (ref 5–8)
PLATELET # BLD AUTO: 181 K/UL
PLATELET # BLD AUTO: 213 K/UL — SIGNIFICANT CHANGE UP (ref 150–400)
POTASSIUM SERPL-MCNC: 4.5 MMOL/L — SIGNIFICANT CHANGE UP (ref 3.5–5.3)
POTASSIUM SERPL-SCNC: 4.5 MMOL/L — SIGNIFICANT CHANGE UP (ref 3.5–5.3)
POTASSIUM SERPL-SCNC: 4.7 MMOL/L
PROT ?TM UR-MCNC: >2000 MG/DL — SIGNIFICANT CHANGE UP
PROT SERPL-MCNC: 5.6 G/DL
PROT SERPL-MCNC: 6.2 G/DL — SIGNIFICANT CHANGE UP (ref 6–8.3)
PROT UR-MCNC: >=1000 MG/DL
PROT/CREAT UR-RTO: SIGNIFICANT CHANGE UP RATIO (ref 0–0.2)
RBC # BLD: 3.15 M/UL
RBC # BLD: 3.37 M/UL — LOW (ref 3.8–5.2)
RBC # FLD: 15.6 % — HIGH (ref 10.3–14.5)
RBC # FLD: 15.9 %
RBC CASTS # UR COMP ASSIST: 4 /HPF — SIGNIFICANT CHANGE UP (ref 0–4)
REVIEW: SIGNIFICANT CHANGE UP
RH IG SCN BLD-IMP: POSITIVE — SIGNIFICANT CHANGE UP
SODIUM SERPL-SCNC: 136 MMOL/L — SIGNIFICANT CHANGE UP (ref 135–145)
SODIUM SERPL-SCNC: 138 MMOL/L
SP GR SPEC: 1.05 — HIGH (ref 1–1.03)
SQUAMOUS # UR AUTO: 33 /HPF — HIGH (ref 0–5)
URATE SERPL-MCNC: 7.7 MG/DL — HIGH (ref 2.5–7)
UROBILINOGEN FLD QL: 1 MG/DL — SIGNIFICANT CHANGE UP (ref 0.2–1)
WBC # BLD: 9.86 K/UL — SIGNIFICANT CHANGE UP (ref 3.8–10.5)
WBC # FLD AUTO: 8.03 K/UL
WBC # FLD AUTO: 9.86 K/UL — SIGNIFICANT CHANGE UP (ref 3.8–10.5)
WBC UR QL: 94 /HPF — HIGH (ref 0–5)

## 2024-09-25 PROCEDURE — 59510 CESAREAN DELIVERY: CPT | Mod: U7,GC

## 2024-09-25 PROCEDURE — 88307 TISSUE EXAM BY PATHOLOGIST: CPT | Mod: 26

## 2024-09-25 PROCEDURE — 58700 REMOVAL OF FALLOPIAN TUBE: CPT | Mod: GC

## 2024-09-25 PROCEDURE — 88302 TISSUE EXAM BY PATHOLOGIST: CPT | Mod: 26

## 2024-09-25 RX ORDER — MAGNESIUM SULFATE 500 MG/ML
2 VIAL (ML) INJECTION
Qty: 40 | Refills: 0 | Status: DISCONTINUED | OUTPATIENT
Start: 2024-09-25 | End: 2024-09-25

## 2024-09-25 RX ORDER — OXYTOCIN/RINGER'S LACTATE 20/500ML
167 PLASTIC BAG, INJECTION (ML) INTRAVENOUS
Qty: 30 | Refills: 0 | Status: DISCONTINUED | OUTPATIENT
Start: 2024-09-25 | End: 2024-09-25

## 2024-09-25 RX ORDER — MAGNESIUM SULFATE 500 MG/ML
4 VIAL (ML) INJECTION ONCE
Refills: 0 | Status: COMPLETED | OUTPATIENT
Start: 2024-09-25 | End: 2024-09-25

## 2024-09-25 RX ORDER — ONDANSETRON HCL/PF 4 MG/2 ML
4 VIAL (ML) INJECTION EVERY 6 HOURS
Refills: 0 | Status: DISCONTINUED | OUTPATIENT
Start: 2024-09-27 | End: 2024-09-29

## 2024-09-25 RX ORDER — SODIUM CITRATE AND CITRIC ACID MONOHYDRATE 334; 500 MG/5ML; MG/5ML
30 SOLUTION ORAL ONCE
Refills: 0 | Status: DISCONTINUED | OUTPATIENT
Start: 2024-09-25 | End: 2024-09-25

## 2024-09-25 RX ORDER — MAGNESIUM SULFATE 500 MG/ML
4 VIAL (ML) INJECTION ONCE
Refills: 0 | Status: DISCONTINUED | OUTPATIENT
Start: 2024-09-25 | End: 2024-09-25

## 2024-09-25 RX ORDER — CHLORHEXIDINE GLUCONATE ORAL RINSE 1.2 MG/ML
1 SOLUTION DENTAL DAILY
Refills: 0 | Status: DISCONTINUED | OUTPATIENT
Start: 2024-09-25 | End: 2024-09-25

## 2024-09-25 RX ORDER — FAMOTIDINE 40 MG
20 TABLET ORAL ONCE
Refills: 0 | Status: DISCONTINUED | OUTPATIENT
Start: 2024-09-25 | End: 2024-09-25

## 2024-09-25 RX ORDER — SODIUM CHLORIDE IRRIG SOLUTION 0.9 %
1000 SOLUTION, IRRIGATION IRRIGATION
Refills: 0 | Status: DISCONTINUED | OUTPATIENT
Start: 2024-09-25 | End: 2024-09-25

## 2024-09-25 RX ORDER — INFLUENZA VIRUS VACCINE 15; 15; 15; 15 UG/.5ML; UG/.5ML; UG/.5ML; UG/.5ML
0.5 SUSPENSION INTRAMUSCULAR ONCE
Refills: 0 | Status: COMPLETED | OUTPATIENT
Start: 2024-09-25 | End: 2024-09-30

## 2024-09-25 RX ADMIN — Medication 125 MILLILITER(S): at 21:20

## 2024-09-25 RX ADMIN — Medication 300 GRAM(S): at 21:18

## 2024-09-25 RX ADMIN — Medication 10 MILLIGRAM(S): at 21:02

## 2024-09-25 RX ADMIN — Medication 50 GM/HR: at 21:39

## 2024-09-25 RX ADMIN — Medication 10 MILLIGRAM(S): at 20:34

## 2024-09-25 NOTE — OB NEONATOLOGY/PEDIATRICIAN DELIVERY SUMMARY - NSMATERNALFETALCONCERNS_OBGYN_ALL_OB_FT
Was the patient seen in the last year in this department? Yes    Does patient have an active prescription for medications requested? Yes    Received Request Via: Patient  
Maternal/Fetal Alert  AMA  PREVIA   DI /DI TWINS   elevated bp's at 1111Marcus 9/24 - see report   ALERT ANESTHESIA   Candi Ware RN 9/24/2024

## 2024-09-25 NOTE — OB PROVIDER H&P - ASSESSMENT
This is a 39y female  @ 36w 2d , admit for  and BTL for severe preeclampsia.     Pt received nifedipine 10mg IR, repeat BP 20 min later was severe and 10mg IR given again.     - Discussed with Dr Doherty  - Admit to Labor and Delivery  - Continuous EFM  - NPO, IV fluids  - Consents signed with Dr Doherty   - Mag sulfate for seizure prophylaxis   - Standard labs (T&S, CBC, RPR)  - Epidural  - 2U PRBC ordered   - Huddle performed with Dr Doherty, anesthesia, charge RN    Risks, benefits, alternatives, and possible complications have been discussed in detail with the patient in her native language. All questions answered, all appropriate hospital consents were signed.   Informed consent was obtained. The following was discussed:  - Obstetrical management including internal fetal/contraction monitoring  - Normal vaginal delivery  - Possible  section

## 2024-09-25 NOTE — OB RN TRIAGE NOTE - NSMATERNALFETALCONCERNS_OBGYN_ALL_OB_FT
Maternal/Fetal Alert  AMA  PREVIA   DI /DI TWINS   elevated bp's at 1111Marcus 9/24 - see report   ALERT ANESTHESIA   Candi Ware RN 9/24/2024

## 2024-09-25 NOTE — OB RN DELIVERY SUMMARY - NS_SEPSISRSKCALC_OBGYN_ALL_OB_FT
EOS calculated successfully. EOS Risk Factor: 0.5/1000 live births ([facility] national incidence); GA=36w2d; Temp=98.6; ROM=0.017; GBS='Unknown'; Antibiotics='No antibiotics or any antibiotics < 2 hrs prior to birth'

## 2024-09-25 NOTE — OB NEONATOLOGY/PEDIATRICIAN DELIVERY SUMMARY - NSPEDSNEONOTESA_OBGYN_ALL_OB_FT
TWIN A: Pediatrician called to delivery for multiple gestation, magnesium. Male infant born at 36+2/7 wks via  to a 38 y/o  blood type B+ mother. Maternal history of dermoid tumor, leep procedure, breast reduction. Prenatal history of placenta previa, PEC on mag. Prenatal labs nr/immune/-, GBS unknown.  AROM at delivery with clear fluids. EOS score n/a, highest maternal temperature 36.9. Baby with low tone, not yet crying. Cord clamping delayed 30 sec. Infant was brought to radiant warmer and warmed, dried, stimulated and suctioned. HR>100, weak respiratory effort. CPAP initiated at 2 MOL to max setting 6/30%. APGARS of 6/7. Evaluated in OR by NICU fellow and transported to NICU on CPAP 6/21%. Mom is initiating formula feeding. Consents to Hepatitis B vaccination. Desires for infant to be circumcised. Admitted under Dr. Rouse.     BW: 2650  : 24  TOB: 22:55

## 2024-09-25 NOTE — OB RN TRIAGE NOTE - NSICDXPASTMEDICALHX_GEN_ALL_CORE_FT
PAST MEDICAL HISTORY:  Enlarged Thyroid yearly follow up    GERD (Gastroesophageal Reflux Disease)     MVP (Mitral Valve Prolapse)

## 2024-09-25 NOTE — OB PROVIDER H&P - HISTORY OF PRESENT ILLNESS
This is a 40 y/o  at 36w 2d (EDC 10/21) TIUP who presents with elevated BP's. She was seen at [facility] yesterday and had severe range BPs there but did not want to be delivered so she was going to see her doctor in the office today for BP check. Her P/C ratio came back 1.0 and patient was advised to come in for . She denies headache, blurry vision, RUQ, epigastric pain. She states she feels short of breath when walking and positional which has been going on for quite some time.     PNC with Dr. Doherty. AP course complicated by:  -gHTN. Pt has history of PEC with previous two pregnancies. She has had elevated BP's x 1 month in this pregnancy.   - Complete anterior previa twin A   - ATU scan yesterday:   Fetus A: vertex, complete anterior previa, EFW 2712gm. Fetus on maternal left  Fetus B; Transverse, posterior previa, EFW 2761gm. Fetus on maternal right. Back up

## 2024-09-25 NOTE — OB PROVIDER H&P - NSHPPHYSICALEXAM_GEN_ALL_CORE
T(C): 36.9 (09-25-24 @ 20:30), Max: 36.9 (09-25-24 @ 20:30)  HR: 112 (09-25-24 @ 21:14) (97 - 126)  BP: 161/98 (09-25-24 @ 20:54) (160/116 - 182/111)  RR: 15 (09-25-24 @ 20:30) (15 - 16)  SpO2: 93% (09-25-24 @ 21:15) (85% - 100%)  General: Female sitting comfortably   Neuro: No facial asymmetry, no slurred speech, moves all 4 extremities  Mood: Alert and lucid, appropriate mood and affect  Head: Normocephalic. Atraumatic.   Eyes: No discharge, lids normal, conjunctiva normal  Lungs: No respiratory distress  Abdomen: Soft, nontender. Gravid.   TAUS:  fetus A vertex, fetus B breech   NST: baseline 145, moderate variability, +accels, no decels. Reactive  fetus B: baseline 150, moderate variability, +accels, no decels. Loss of contact multiple times. reactive   TOCO: no ctx   SVE: deferred  EFW: 2712 fetus A, 2761 fetus B

## 2024-09-25 NOTE — OB RN INTRAOPERATIVE NOTE - NSSELHIDDEN_OBGYN_ALL_OB_FT
[NS_DeliveryAttending1_OBGYN_ALL_OB_FT:[id number]],[NS_DeliveryAssist1_OBGYN_ALL_OB_FT:[id number]=],[NS_DeliveryRN_OBGYN_ALL_OB_FT:[id number]=]

## 2024-09-25 NOTE — OB RN DELIVERY SUMMARY - NSSELHIDDEN_OBGYN_ALL_OB_FT
[NS_DeliveryAttending1_OBGYN_ALL_OB_FT:[id number]],[NS_DeliveryAssist1_OBGYN_ALL_OB_FT:[id number]=],[NS_DeliveryRN_OBGYN_ALL_OB_FT:[id number]=]

## 2024-09-25 NOTE — OB PROVIDER H&P - ATTENDING COMMENTS
Twin gestation at 36 weeks with anterior placenta previa and severe preeclampsia, presents for delivery

## 2024-09-25 NOTE — OB NEONATOLOGY/PEDIATRICIAN DELIVERY SUMMARY - NSPEDSNEONOTESB_OBGYN_ALL_OB_FT
69.6
TWIN B: Pediatrician called to delivery for multiple gestation, magnesium. Female infant born at 36+2/7 wks via  to a 40 y/o  blood type B+ mother. Maternal history of dermoid tumor, leep procedure, breast reduction. Prenatal history of placenta previa, PEC on mag. Prenatal labs nr/immune/-, GBS unknown.  AROM at delivery with clear fluids. EOS score n/a, highest maternal temperature 36.9. Baby emerged with good tone, strong cry. Cord clamping delayed 60 sec. Infant was brought to radiant warmer and warmed, dried, stimulated and suctioned. HR>100, consistent respiratory effort. APGARS of 8/9. Mom is initiating formula feeding. Consents to Hepatitis B vaccination. Admitted under Dr. Prabhakar.     BW: pending   : 24  TOB: 22:56

## 2024-09-25 NOTE — OB PROVIDER H&P - PROBLEM SELECTOR PLAN 1
- Discussed with Dr Doherty  - Admit to Labor and Delivery  - Continuous EFM  - NPO, IV fluids  - Consents signed with Dr Doherty   - Mag sulfate for seizure prophylaxis   - Standard labs (T&S, CBC, RPR)  - Epidural  - 2U PRBC ordered   - Huddle performed with Dr Doherty, anesthesia, charge RN

## 2024-09-25 NOTE — OB RN PATIENT PROFILE - FALL HARM RISK - UNIVERSAL INTERVENTIONS
Bed in lowest position, wheels locked, appropriate side rails in place/Call bell, personal items and telephone in reach/Instruct patient to call for assistance before getting out of bed or chair/Non-slip footwear when patient is out of bed/[place] to call system/Physically safe environment - no spills, clutter or unnecessary equipment/Purposeful Proactive Rounding/Room/bathroom lighting operational, light cord in reach

## 2024-09-26 PROBLEM — U07.1 COVID-19: Chronic | Status: INACTIVE | Noted: 2022-05-28 | Resolved: 2024-09-25

## 2024-09-26 LAB
ALBUMIN SERPL ELPH-MCNC: 2.6 G/DL — LOW (ref 3.3–5)
ALP SERPL-CCNC: 152 U/L — HIGH (ref 40–120)
ALT FLD-CCNC: 5 U/L — SIGNIFICANT CHANGE UP (ref 4–33)
ANION GAP SERPL CALC-SCNC: 11 MMOL/L — SIGNIFICANT CHANGE UP (ref 7–14)
AST SERPL-CCNC: 19 U/L — SIGNIFICANT CHANGE UP (ref 4–32)
BASOPHILS # BLD AUTO: 0.01 K/UL — SIGNIFICANT CHANGE UP (ref 0–0.2)
BASOPHILS NFR BLD AUTO: 0.1 % — SIGNIFICANT CHANGE UP (ref 0–2)
BILIRUB SERPL-MCNC: 0.6 MG/DL — SIGNIFICANT CHANGE UP (ref 0.2–1.2)
BUN SERPL-MCNC: 13 MG/DL — SIGNIFICANT CHANGE UP (ref 7–23)
CALCIUM SERPL-MCNC: 7.7 MG/DL — LOW (ref 8.4–10.5)
CHLORIDE SERPL-SCNC: 102 MMOL/L — SIGNIFICANT CHANGE UP (ref 98–107)
CO2 SERPL-SCNC: 17 MMOL/L — LOW (ref 22–31)
CREAT SERPL-MCNC: 1.01 MG/DL — SIGNIFICANT CHANGE UP (ref 0.5–1.3)
EGFR: 73 ML/MIN/1.73M2 — SIGNIFICANT CHANGE UP
EOSINOPHIL # BLD AUTO: 0 K/UL — SIGNIFICANT CHANGE UP (ref 0–0.5)
EOSINOPHIL NFR BLD AUTO: 0 % — SIGNIFICANT CHANGE UP (ref 0–6)
GLUCOSE SERPL-MCNC: 153 MG/DL — HIGH (ref 70–99)
HCT VFR BLD CALC: 27.1 % — LOW (ref 34.5–45)
HGB BLD-MCNC: 8.7 G/DL — LOW (ref 11.5–15.5)
IANC: 11.77 K/UL — HIGH (ref 1.8–7.4)
IMM GRANULOCYTES NFR BLD AUTO: 0.7 % — SIGNIFICANT CHANGE UP (ref 0–0.9)
LDH SERPL L TO P-CCNC: 354 U/L — HIGH (ref 135–225)
LYMPHOCYTES # BLD AUTO: 0.81 K/UL — LOW (ref 1–3.3)
LYMPHOCYTES # BLD AUTO: 6.2 % — LOW (ref 13–44)
MAGNESIUM SERPL-MCNC: 5.2 MG/DL — HIGH (ref 1.6–2.6)
MAGNESIUM SERPL-MCNC: 6.5 MG/DL — HIGH (ref 1.6–2.6)
MAGNESIUM SERPL-MCNC: 7.6 MG/DL — CRITICAL HIGH (ref 1.6–2.6)
MCHC RBC-ENTMCNC: 30.2 PG — SIGNIFICANT CHANGE UP (ref 27–34)
MCHC RBC-ENTMCNC: 32.1 GM/DL — SIGNIFICANT CHANGE UP (ref 32–36)
MCV RBC AUTO: 94.1 FL — SIGNIFICANT CHANGE UP (ref 80–100)
MONOCYTES # BLD AUTO: 0.48 K/UL — SIGNIFICANT CHANGE UP (ref 0–0.9)
MONOCYTES NFR BLD AUTO: 3.6 % — SIGNIFICANT CHANGE UP (ref 2–14)
NEUTROPHILS # BLD AUTO: 11.77 K/UL — HIGH (ref 1.8–7.4)
NEUTROPHILS NFR BLD AUTO: 89.4 % — HIGH (ref 43–77)
NRBC # BLD: 0 /100 WBCS — SIGNIFICANT CHANGE UP (ref 0–0)
NRBC # FLD: 0 K/UL — SIGNIFICANT CHANGE UP (ref 0–0)
PLATELET # BLD AUTO: 196 K/UL — SIGNIFICANT CHANGE UP (ref 150–400)
POTASSIUM SERPL-MCNC: 4.7 MMOL/L — SIGNIFICANT CHANGE UP (ref 3.5–5.3)
POTASSIUM SERPL-SCNC: 4.7 MMOL/L — SIGNIFICANT CHANGE UP (ref 3.5–5.3)
PROT SERPL-MCNC: 5.5 G/DL — LOW (ref 6–8.3)
RBC # BLD: 2.88 M/UL — LOW (ref 3.8–5.2)
RBC # FLD: 16 % — HIGH (ref 10.3–14.5)
SODIUM SERPL-SCNC: 130 MMOL/L — LOW (ref 135–145)
T PALLIDUM AB TITR SER: NEGATIVE — SIGNIFICANT CHANGE UP
URATE SERPL-MCNC: 7.2 MG/DL — HIGH (ref 2.5–7)
WBC # BLD: 13.16 K/UL — HIGH (ref 3.8–10.5)
WBC # FLD AUTO: 13.16 K/UL — HIGH (ref 3.8–10.5)

## 2024-09-26 PROCEDURE — 93010 ELECTROCARDIOGRAM REPORT: CPT

## 2024-09-26 RX ORDER — SENNOSIDES 8.6 MG
1 TABLET ORAL
Refills: 0 | Status: DISCONTINUED | OUTPATIENT
Start: 2024-09-26 | End: 2024-09-27

## 2024-09-26 RX ORDER — FERROUS SULFATE 325(65) MG
325 TABLET ORAL THREE TIMES A DAY
Refills: 0 | Status: DISCONTINUED | OUTPATIENT
Start: 2024-09-26 | End: 2024-09-30

## 2024-09-26 RX ORDER — MAGNESIUM SULFATE 500 MG/ML
2 VIAL (ML) INJECTION
Qty: 40 | Refills: 0 | Status: DISCONTINUED | OUTPATIENT
Start: 2024-09-26 | End: 2024-09-26

## 2024-09-26 RX ORDER — SODIUM CHLORIDE IRRIG SOLUTION 0.9 %
1000 SOLUTION, IRRIGATION IRRIGATION
Refills: 0 | Status: DISCONTINUED | OUTPATIENT
Start: 2024-09-26 | End: 2024-09-29

## 2024-09-26 RX ORDER — SODIUM CHLORIDE IRRIG SOLUTION 0.9 %
500 SOLUTION, IRRIGATION IRRIGATION ONCE
Refills: 0 | Status: DISCONTINUED | OUTPATIENT
Start: 2024-09-26 | End: 2024-09-30

## 2024-09-26 RX ORDER — OXYCODONE HYDROCHLORIDE 30 MG/1
5 TABLET, FILM COATED, EXTENDED RELEASE ORAL
Refills: 0 | Status: COMPLETED | OUTPATIENT
Start: 2024-09-26 | End: 2024-10-03

## 2024-09-26 RX ORDER — SODIUM CHLORIDE IRRIG SOLUTION 0.9 %
500 SOLUTION, IRRIGATION IRRIGATION ONCE
Refills: 0 | Status: COMPLETED | OUTPATIENT
Start: 2024-09-26 | End: 2024-09-26

## 2024-09-26 RX ORDER — ONDANSETRON HCL/PF 4 MG/2 ML
4 VIAL (ML) INJECTION ONCE
Refills: 0 | Status: COMPLETED | OUTPATIENT
Start: 2024-09-26 | End: 2024-09-26

## 2024-09-26 RX ORDER — MAGNESIUM HYDROXIDE 400 MG/5ML
30 SUSPENSION, ORAL (FINAL DOSE FORM) ORAL
Refills: 0 | Status: DISCONTINUED | OUTPATIENT
Start: 2024-09-26 | End: 2024-09-30

## 2024-09-26 RX ORDER — LABETALOL HYDROCHLORIDE 200 MG/1
200 TABLET, FILM COATED ORAL
Refills: 0 | Status: DISCONTINUED | OUTPATIENT
Start: 2024-09-26 | End: 2024-09-30

## 2024-09-26 RX ORDER — DIPHENOXYLATE HCL/ATROPINE 2.5-.025MG
1 TABLET ORAL ONCE
Refills: 0 | Status: DISCONTINUED | OUTPATIENT
Start: 2024-09-26 | End: 2024-09-26

## 2024-09-26 RX ORDER — DIPHENHYDRAMINE HCL 12.5MG/5ML
25 LIQUID (ML) ORAL EVERY 6 HOURS
Refills: 0 | Status: COMPLETED | OUTPATIENT
Start: 2024-09-26 | End: 2025-08-25

## 2024-09-26 RX ORDER — OXYCODONE HYDROCHLORIDE 30 MG/1
5 TABLET, FILM COATED, EXTENDED RELEASE ORAL ONCE
Refills: 0 | Status: DISCONTINUED | OUTPATIENT
Start: 2024-09-26 | End: 2024-09-30

## 2024-09-26 RX ORDER — TETANUS TOXOID, REDUCED DIPHTHERIA TOXOID AND ACELLULAR PERTUSSIS VACCINE, ADSORBED 5; 2.5; 8; 8; 2.5 [IU]/.5ML; [IU]/.5ML; UG/.5ML; UG/.5ML; UG/.5ML
0.5 SUSPENSION INTRAMUSCULAR ONCE
Refills: 0 | Status: COMPLETED | OUTPATIENT
Start: 2024-09-26

## 2024-09-26 RX ORDER — ASPIRIN 325 MG
2 TABLET ORAL
Refills: 0 | DISCHARGE

## 2024-09-26 RX ORDER — KETOROLAC TROMETHAMINE 10 MG/1
30 TABLET, FILM COATED ORAL EVERY 6 HOURS
Refills: 0 | Status: DISCONTINUED | OUTPATIENT
Start: 2024-09-26 | End: 2024-09-27

## 2024-09-26 RX ORDER — ACETAMINOPHEN 325 MG
975 TABLET ORAL
Refills: 0 | Status: DISCONTINUED | OUTPATIENT
Start: 2024-09-26 | End: 2024-09-30

## 2024-09-26 RX ORDER — OXYTOCIN/RINGER'S LACTATE 20/500ML
167 PLASTIC BAG, INJECTION (ML) INTRAVENOUS
Qty: 30 | Refills: 0 | Status: DISCONTINUED | OUTPATIENT
Start: 2024-09-26 | End: 2024-09-29

## 2024-09-26 RX ADMIN — Medication 5000 UNIT(S): at 17:44

## 2024-09-26 RX ADMIN — KETOROLAC TROMETHAMINE 30 MILLIGRAM(S): 10 TABLET, FILM COATED ORAL at 11:15

## 2024-09-26 RX ADMIN — Medication 975 MILLIGRAM(S): at 09:35

## 2024-09-26 RX ADMIN — KETOROLAC TROMETHAMINE 30 MILLIGRAM(S): 10 TABLET, FILM COATED ORAL at 17:45

## 2024-09-26 RX ADMIN — Medication 167 MILLIUNIT(S)/MIN: at 04:09

## 2024-09-26 RX ADMIN — Medication 30 MILLIGRAM(S): at 04:08

## 2024-09-26 RX ADMIN — Medication 325 MILLIGRAM(S): at 20:50

## 2024-09-26 RX ADMIN — Medication 1000 MILLILITER(S): at 01:20

## 2024-09-26 RX ADMIN — KETOROLAC TROMETHAMINE 30 MILLIGRAM(S): 10 TABLET, FILM COATED ORAL at 06:34

## 2024-09-26 RX ADMIN — Medication 325 MILLIGRAM(S): at 15:18

## 2024-09-26 RX ADMIN — Medication 50 GM/HR: at 19:26

## 2024-09-26 RX ADMIN — Medication 50 GM/HR: at 07:11

## 2024-09-26 RX ADMIN — Medication 80 MILLIGRAM(S): at 09:35

## 2024-09-26 RX ADMIN — KETOROLAC TROMETHAMINE 30 MILLIGRAM(S): 10 TABLET, FILM COATED ORAL at 06:04

## 2024-09-26 RX ADMIN — Medication 80 MILLIGRAM(S): at 20:51

## 2024-09-26 RX ADMIN — LABETALOL HYDROCHLORIDE 200 MILLIGRAM(S): 200 TABLET, FILM COATED ORAL at 23:12

## 2024-09-26 RX ADMIN — KETOROLAC TROMETHAMINE 30 MILLIGRAM(S): 10 TABLET, FILM COATED ORAL at 18:13

## 2024-09-26 RX ADMIN — KETOROLAC TROMETHAMINE 30 MILLIGRAM(S): 10 TABLET, FILM COATED ORAL at 11:41

## 2024-09-26 RX ADMIN — Medication 4 MILLIGRAM(S): at 11:15

## 2024-09-26 RX ADMIN — Medication 500 MILLIGRAM(S): at 15:18

## 2024-09-26 RX ADMIN — Medication 975 MILLIGRAM(S): at 20:50

## 2024-09-26 RX ADMIN — Medication 50 MILLILITER(S): at 04:09

## 2024-09-26 RX ADMIN — Medication 1 TABLET(S): at 00:35

## 2024-09-26 RX ADMIN — Medication 5000 UNIT(S): at 06:04

## 2024-09-26 RX ADMIN — LABETALOL HYDROCHLORIDE 200 MILLIGRAM(S): 200 TABLET, FILM COATED ORAL at 11:16

## 2024-09-26 RX ADMIN — Medication 975 MILLIGRAM(S): at 10:08

## 2024-09-26 RX ADMIN — Medication 50 GM/HR: at 04:25

## 2024-09-26 RX ADMIN — Medication 975 MILLIGRAM(S): at 21:20

## 2024-09-26 NOTE — DISCHARGE NOTE OB - CARE PROVIDER_API CALL
Negro Doherty  Obstetrics and Gynecology  [address], [address]  [place] [address]  Phone: [phone]  Fax: [phone]  Follow Up Time:

## 2024-09-26 NOTE — OB PROVIDER DELIVERY SUMMARY - NSSELHIDDEN_OBGYN_ALL_OB_FT
[NS_DeliveryAttending1_OBGYN_ALL_OB_FT:[id number]],[NS_DeliveryAssist1_OBGYN_ALL_OB_FT:[id number]=],[NS_DeliveryRN_OBGYN_ALL_OB_FT:[email or web address]=]

## 2024-09-26 NOTE — DISCHARGE NOTE OB - PATIENT PORTAL LINK FT
You can access the FollowMyHealth Patient Portal offered by [facility] by registering at the following website: [email or web address]. By joining [facility] [employer]’s FollowMyHealth portal, you will also be able to view your health information using other applications (apps) compatible with our system.

## 2024-09-26 NOTE — PROGRESS NOTE ADULT - ASSESSMENT
A/P: 38yo POD#1 s/p pMTCS + BS for di/di TIUP c/b sPEC.  Patient is stable and doing well post-operatively.      #sPEC  - Magnesium for seizure prophylaxis, cont 24h postpartum  - s/p Procardia 10(9/25)  - Cont [id number] QD   - HELLP labs wnl  - cont to monitor for S/s sPEC  - d/c home with BP cuff     - Continue regular diet.  - Increase ambulation.  - Continue motrin, tylenol, oxycodone PRN for pain control.    - F/u AM CBC    Amber Suarez, PGY1

## 2024-09-26 NOTE — DISCHARGE NOTE OB - PLAN OF CARE
normal activity, regular diet follow up in office in 2 weeks After discharge, please stay on pelvic rest for 6 weeks, meaning no sexual intercourse, no tampons and no douching.  No driving for 2 weeks as women can loose a lot of blood during delivery and there is a possibility of being lightheaded/fainting.  No lifting objects heavier than baby for two weeks.  Expect to have vaginal bleeding/spotting for up to six weeks.  The bleeding should get lighter and more white/light brown with time.  For bleeding soaking more than a pad an hour or passing clots greater than the size of your fist, come in to the emergency department.    Follow up in OB office in 1-2 weeks for incision check.  Call for noticeable increase in redness or swelling at incision, discharge from incision, or opening of skin at incision site Follow-up in your OB office within 1 week for a blood pressure check.   Please take your blood pressure 3x/day. Call your doctor if your blood pressure is 140 (top number) OR 90 (bottom number) or higher. Return to hospital if your blood pressure is 160 (top number)  (bottom number) or higher. Call your doctor if you experience symptoms such as headache, blurry vision, epigastric pain, or nausea/vomiting.

## 2024-09-26 NOTE — DISCHARGE NOTE OB - HOSPITAL COURSE
Twin gestation at 36 weeks with placenta previa and preeclampsia admitted for  delivery secondary to worsening preeclampsia. pt underwent a primary mid transverse  section on 24 and delivered live male and live female newborns. pt did well subsequently and was discharged home Twin gestation at 36 weeks with placenta previa and preeclampsia admitted for  delivery secondary to worsening preeclampsia. pt underwent a primary mid transverse  section  and bilateral salpingectomy on 24 and delivered live male and live female newborns. pt did well subsequently and was discharged home

## 2024-09-26 NOTE — DISCHARGE NOTE OB - MEDICATION SUMMARY - MEDICATIONS TO TAKE
I will START or STAY ON the medications listed below when I get home from the hospital:    ibuprofen 600 mg oral tablet  -- 1 tab(s) by mouth every 6 hours  -- Indication: For Severe preeclampsia, third trimester   I will START or STAY ON the medications listed below when I get home from the hospital:    ibuprofen 600 mg oral tablet  -- 1 tab(s) by mouth every 6 hours as needed for  moderate pain  -- Indication: For Pain    acetaminophen 500 mg oral tablet  -- 2 tab(s) by mouth every 6 hours as needed for  mild pain  -- Indication: For Pain    multivitamin, prenatal  -- 1 tab(s) by mouth once a day  -- Indication: For Vitamin    ferrous sulfate 325 mg (65 mg elemental iron) oral tablet  -- 1 tab(s) by mouth 3 times a day  -- Indication: For Anemia    ascorbic acid 500 mg oral tablet  -- 1 tab(s) by mouth once a day  -- Indication: For Vitamin   I will START or STAY ON the medications listed below when I get home from the hospital:    ibuprofen 600 mg oral tablet  -- 1 tab(s) by mouth every 6 hours as needed for  moderate pain  -- Indication: For Pain    acetaminophen 500 mg oral tablet  -- 2 tab(s) by mouth every 6 hours as needed for  mild pain  -- Indication: For Pain    labetalol 200 mg oral tablet  -- 1 tab(s) by mouth 2 times a day If you had high blood pressure during your pregnancy, during labor, or after delivery, please follow up with your OB/Gyn in 2 days for a BP check.    Continue to monitor your blood pressures three times daily at home (before breakfast, lunch, and dinner).  If any blood pressures are GREATER than 160/110, or if you experience changes in your vision, headache not improved with tylenol, persistent nausea, vomiting and/or right upper abdominal pain, present to ED for further evaluation.    If you are taking blood pressure medication, take your blood pressure before taking your medication. If LESS [id number] systolic (top number) and/or LESS than 60 diastolic (bottom number), do not take your medication.  -- Indication: For blood pressure    Procardia XL 60 mg oral tablet, extended release  -- 1 tab(s) by mouth once a day If you had high blood pressure during your pregnancy, during labor, or after delivery, please follow up with your OB/Gyn in 2 days for a BP check.    Continue to monitor your blood pressures three times daily at home (before breakfast, lunch, and dinner).  If any blood pressures are GREATER than 160/110, or if you experience changes in your vision, headache not improved with tylenol, persistent nausea, vomiting and/or right upper abdominal pain, present to ED for further evaluation.    If you are taking blood pressure medication, take your blood pressure before taking your medication. If LESS [id number] systolic (top number) and/or LESS than 60 diastolic (bottom number), do not take your medication.  -- Indication: For blood pressure    multivitamin, prenatal  -- 1 tab(s) by mouth once a day  -- Indication: For Vitamin    ferrous sulfate 325 mg (65 mg elemental iron) oral tablet  -- 1 tab(s) by mouth 3 times a day  -- Indication: For Anemia    ascorbic acid 500 mg oral tablet  -- 1 tab(s) by mouth once a day  -- Indication: For Vitamin

## 2024-09-26 NOTE — DISCHARGE NOTE OB - SECONDARY DIAGNOSIS.
Dichorionic diamniotic twin pregnancy in third trimester Placenta previa antepartum, third trimester Severe pre-eclampsia

## 2024-09-26 NOTE — DISCHARGE NOTE OB - MEDICATION SUMMARY - MEDICATIONS TO STOP TAKING
I will STOP taking the medications listed below when I get home from the hospital:    aspirin 81 mg oral tablet, chewable  -- 2 tab(s) chewed once a day

## 2024-09-26 NOTE — OB PROVIDER DELIVERY SUMMARY - NSATTENDATTESTATION_OBGYN_A_OB
What Is The Reason For Today's Visit?: Preventative Skin Check
Additional History: Met deductible so wants skin check
I was physically present and participated in this delivery.

## 2024-09-26 NOTE — DISCHARGE NOTE OB - CARE PLAN
Principal Discharge DX:	Severe preeclampsia, third trimester  Assessment and plan of treatment:	normal activity, regular diet follow up in office in 2 weeks  Secondary Diagnosis:	Dichorionic diamniotic twin pregnancy in third trimester  Secondary Diagnosis:	Placenta previa antepartum, third trimester   1 Principal Discharge DX:	Status post  section  Assessment and plan of treatment:	After discharge, please stay on pelvic rest for 6 weeks, meaning no sexual intercourse, no tampons and no douching.  No driving for 2 weeks as women can loose a lot of blood during delivery and there is a possibility of being lightheaded/fainting.  No lifting objects heavier than baby for two weeks.  Expect to have vaginal bleeding/spotting for up to six weeks.  The bleeding should get lighter and more white/light brown with time.  For bleeding soaking more than a pad an hour or passing clots greater than the size of your fist, come in to the emergency department.    Follow up in OB office in 1-2 weeks for incision check.  Call for noticeable increase in redness or swelling at incision, discharge from incision, or opening of skin at incision site  Secondary Diagnosis:	Severe pre-eclampsia  Assessment and plan of treatment:	Follow-up in your OB office within 1 week for a blood pressure check.   Please take your blood pressure 3x/day. Call your doctor if your blood pressure is 140 (top number) OR 90 (bottom number) or higher. Return to hospital if your blood pressure is 160 (top number)  (bottom number) or higher. Call your doctor if you experience symptoms such as headache, blurry vision, epigastric pain, or nausea/vomiting.

## 2024-09-26 NOTE — PROGRESS NOTE ADULT - SUBJECTIVE AND OBJECTIVE BOX
OB Progress Note:  Delivery, POD#1    S: 40yo POD#1 s/p pMTCS +BS c/b sPEC . Her pain is well controlled. She is tolerating a regular diet. Not yet passing flatus. Denies headache, N/V. Denies CP/SOB/lightheadedness/dizziness.   Fraga still in place so pt has not yet voided spontaneously or been OOB.     O:   Vital Signs Last 24 Hrs  T(C): 36.2 (26 Sep 2024 06:04), Max: 37.0 (25 Sep 2024 21:21)  T(F): 97.2 (26 Sep 2024 06:04), Max: 98.6 (25 Sep 2024 21:21)  HR: 90 (26 Sep 2024 06:04) (75 - 126)  BP: 136/88 (26 Sep 2024 06:04) (69/47 - 182/111)  BP(mean): 108 (26 Sep 2024 03:45) (53 - 130)  RR: 18 (26 Sep 2024 06:04) (11 - 23)  SpO2: 98% (26 Sep 2024 06:04) (85% - 100%)    Parameters below as of 25 Sep 2024 21:44  Patient On (Oxygen Delivery Method): room air        Labs:  Blood type: B Positive  Rubella IgG: RPR:                           10.3[L]   9.86 >-----------< 213    (  @ 21:00 )             32.0[L]    24 @ 21:00      136  |  105  |  15  ----------------------------<  83  4.5   |  16[L]  |  1.16        Ca    8.7      25 Sep 2024 21:00  Mg     5.20[H]         TPro  6.2  /  Alb  3.1[L]  /  TBili  0.8  /  DBili  x   /  AST  20  /  ALT  <5  /  AlkPhos  182[H]  24 @ 21:00          PE:  General: NAD  Abdomen: Mildly distended, appropriately tender, incision c/d/i.  Extremities: No erythema, no pitting edema, SCDs in place   : fraga draining clear yellow urine

## 2024-09-26 NOTE — OB PROVIDER DELIVERY SUMMARY - NSPROVIDERDELIVERYNOTE_OBGYN_ALL_OB_FT
primary C/S for severe preeclampsia, malpresentation of twin B and placenta previa,   mid transverse  performed with bilateral salpingectomy, live male and female newborns  EBL- 1000cc.   pt to recovery in stable condition.   ROSEANNE Doherty M.D. Unscheduled pMTCS at 36w2d for di/di TIUP with placenta previa c/b preeclampsia with severe features   Twin A: Viable male infant, apgars 6/7, weight 2650g, vertex presentation  Twin B: Viable female infant, apgars 8/9, weight 2490g, transverse presentation delivered vertex  Hysterotomy closed in 1 layer using Vicryl  Fibroid uterus, otherwise grossly normal b/l tubes and ovaries  Bilateral salpingectomy performed with Ligasure  Abdomen closed in standard fashion  Pt and infants to recovery in stable condition  Pt to remain on magnesium for 24h PP i/s/o preeclampsia with severe features  QBL: 1035   IVF: 1750    UOP: 60    Dictation #[id number]    Delivery with Dr. Bessy Serra, PGY-3      primary C/S for severe preeclampsia, malpresentation of twin B and placenta previa,   mid transverse  performed with bilateral salpingectomy, live male and female newborns  EBL- 1000cc.   pt to recovery in stable condition.   ROSEANNE Doherty M.D.

## 2024-09-26 NOTE — PROGRESS NOTE ADULT - SUBJECTIVE AND OBJECTIVE BOX
INTERVAL HPI/OVERNIGHT EVENTS:  39y Female s/p c section under spinal anesthesia with duramorph for post op analgesia     Vital Signs Last 24 Hrs  T(C): 36.3 (26 Sep 2024 12:18), Max: 37.0 (25 Sep 2024 21:21)  T(F): 97.3 (26 Sep 2024 12:18), Max: 98.6 (25 Sep 2024 21:21)  HR: 95 (26 Sep 2024 12:18) (75 - 126)  BP: 130/79 (26 Sep 2024 12:18) (69/47 - 182/111)  BP(mean): 108 (26 Sep 2024 03:45) (53 - 130)  RR: 18 (26 Sep 2024 12:18) (11 - 23)  SpO2: 100% (26 Sep 2024 12:18) (85% - 100%)    Parameters below as of 26 Sep 2024 12:18  Patient On (Oxygen Delivery Method): room air            Patient's overall anesthesia satisfaction: Positive    Patients pain is well controlled with IT duramorph    No respiratory events overnight    No pruritis at this time    Patient doing well     No headache      No residual numbness or weakness, sensory and motor function intact.    No anesthetic complications or complaints noted or reported          .

## 2024-09-27 LAB
BASOPHILS # BLD AUTO: 0.02 K/UL — SIGNIFICANT CHANGE UP (ref 0–0.2)
BASOPHILS NFR BLD AUTO: 0.2 % — SIGNIFICANT CHANGE UP (ref 0–2)
EOSINOPHIL # BLD AUTO: 0.11 K/UL — SIGNIFICANT CHANGE UP (ref 0–0.5)
EOSINOPHIL NFR BLD AUTO: 1.2 % — SIGNIFICANT CHANGE UP (ref 0–6)
HCT VFR BLD CALC: 22 % — LOW (ref 34.5–45)
HGB BLD-MCNC: 7.1 G/DL — LOW (ref 11.5–15.5)
IANC: 6.99 K/UL — SIGNIFICANT CHANGE UP (ref 1.8–7.4)
IMM GRANULOCYTES NFR BLD AUTO: 0.5 % — SIGNIFICANT CHANGE UP (ref 0–0.9)
LYMPHOCYTES # BLD AUTO: 1.16 K/UL — SIGNIFICANT CHANGE UP (ref 1–3.3)
LYMPHOCYTES # BLD AUTO: 13.1 % — SIGNIFICANT CHANGE UP (ref 13–44)
MCHC RBC-ENTMCNC: 30.6 PG — SIGNIFICANT CHANGE UP (ref 27–34)
MCHC RBC-ENTMCNC: 32.3 GM/DL — SIGNIFICANT CHANGE UP (ref 32–36)
MCV RBC AUTO: 94.8 FL — SIGNIFICANT CHANGE UP (ref 80–100)
MONOCYTES # BLD AUTO: 0.54 K/UL — SIGNIFICANT CHANGE UP (ref 0–0.9)
MONOCYTES NFR BLD AUTO: 6.1 % — SIGNIFICANT CHANGE UP (ref 2–14)
NEUTROPHILS # BLD AUTO: 6.99 K/UL — SIGNIFICANT CHANGE UP (ref 1.8–7.4)
NEUTROPHILS NFR BLD AUTO: 78.9 % — HIGH (ref 43–77)
NRBC # BLD: 0 /100 WBCS — SIGNIFICANT CHANGE UP (ref 0–0)
NRBC # FLD: 0.03 K/UL — HIGH (ref 0–0)
PLATELET # BLD AUTO: 147 K/UL — LOW (ref 150–400)
RBC # BLD: 2.32 M/UL — LOW (ref 3.8–5.2)
RBC # FLD: 16.3 % — HIGH (ref 10.3–14.5)
WBC # BLD: 8.86 K/UL — SIGNIFICANT CHANGE UP (ref 3.8–10.5)
WBC # FLD AUTO: 8.86 K/UL — SIGNIFICANT CHANGE UP (ref 3.8–10.5)

## 2024-09-27 RX ORDER — OXYCODONE HYDROCHLORIDE 30 MG/1
5 TABLET, FILM COATED, EXTENDED RELEASE ORAL
Refills: 0 | Status: DISCONTINUED | OUTPATIENT
Start: 2024-09-27 | End: 2024-09-30

## 2024-09-27 RX ORDER — SENNOSIDES 8.6 MG
1 TABLET ORAL
Refills: 0 | Status: DISCONTINUED | OUTPATIENT
Start: 2024-09-27 | End: 2024-09-30

## 2024-09-27 RX ORDER — SENNOSIDES 8.6 MG
1 TABLET ORAL DAILY
Refills: 0 | Status: DISCONTINUED | OUTPATIENT
Start: 2024-09-27 | End: 2024-09-27

## 2024-09-27 RX ADMIN — Medication 30 MILLIGRAM(S): at 11:56

## 2024-09-27 RX ADMIN — Medication 80 MILLIGRAM(S): at 11:55

## 2024-09-27 RX ADMIN — Medication 600 MILLIGRAM(S): at 12:30

## 2024-09-27 RX ADMIN — Medication 975 MILLIGRAM(S): at 21:09

## 2024-09-27 RX ADMIN — Medication 600 MILLIGRAM(S): at 18:27

## 2024-09-27 RX ADMIN — Medication 500 MILLIGRAM(S): at 11:56

## 2024-09-27 RX ADMIN — Medication 600 MILLIGRAM(S): at 07:00

## 2024-09-27 RX ADMIN — LABETALOL HYDROCHLORIDE 200 MILLIGRAM(S): 200 TABLET, FILM COATED ORAL at 23:22

## 2024-09-27 RX ADMIN — Medication 975 MILLIGRAM(S): at 21:30

## 2024-09-27 RX ADMIN — Medication 5000 UNIT(S): at 06:11

## 2024-09-27 RX ADMIN — LABETALOL HYDROCHLORIDE 200 MILLIGRAM(S): 200 TABLET, FILM COATED ORAL at 10:46

## 2024-09-27 RX ADMIN — Medication 80 MILLIGRAM(S): at 17:57

## 2024-09-27 RX ADMIN — Medication 325 MILLIGRAM(S): at 21:10

## 2024-09-27 RX ADMIN — Medication 975 MILLIGRAM(S): at 09:21

## 2024-09-27 RX ADMIN — KETOROLAC TROMETHAMINE 30 MILLIGRAM(S): 10 TABLET, FILM COATED ORAL at 01:00

## 2024-09-27 RX ADMIN — KETOROLAC TROMETHAMINE 30 MILLIGRAM(S): 10 TABLET, FILM COATED ORAL at 00:02

## 2024-09-27 RX ADMIN — Medication 325 MILLIGRAM(S): at 06:10

## 2024-09-27 RX ADMIN — Medication 5000 UNIT(S): at 17:58

## 2024-09-27 RX ADMIN — Medication 80 MILLIGRAM(S): at 06:11

## 2024-09-27 RX ADMIN — Medication 975 MILLIGRAM(S): at 10:00

## 2024-09-27 RX ADMIN — Medication 1 TABLET(S): at 17:58

## 2024-09-27 RX ADMIN — Medication 600 MILLIGRAM(S): at 11:56

## 2024-09-27 RX ADMIN — Medication 975 MILLIGRAM(S): at 15:25

## 2024-09-27 RX ADMIN — Medication 600 MILLIGRAM(S): at 17:57

## 2024-09-27 RX ADMIN — Medication 975 MILLIGRAM(S): at 16:00

## 2024-09-27 RX ADMIN — Medication 600 MILLIGRAM(S): at 06:11

## 2024-09-27 RX ADMIN — Medication 80 MILLIGRAM(S): at 23:22

## 2024-09-27 RX ADMIN — Medication 325 MILLIGRAM(S): at 11:56

## 2024-09-27 RX ADMIN — Medication 4 MILLIGRAM(S): at 07:54

## 2024-09-27 RX ADMIN — Medication 17 GRAM(S): at 11:55

## 2024-09-27 NOTE — PROGRESS NOTE ADULT - SUBJECTIVE AND OBJECTIVE BOX
OB Progress Note: LTCS, POD#2    S: 40yo POD#2 s/p LTCS. Pain is well controlled. She is tolerating a regular diet. Pt states that she has not yet passed flatus since surgery. Magnesium drip recently discontinued so she has not yet voided spontaneously or been OOB. Overnight, patient states that she had experienced chest tightness when taking deep breath. Patient states that this occurred since she was started on magnesium but has improved since the drip was discontinued. Denies headache, CP, RUQ pain. Denies lightheadedness/dizziness. Denies N/V.    O:  Vitals:  Vital Signs Last 24 Hrs  T(C): 36.3 (27 Sep 2024 05:53), Max: 36.6 (26 Sep 2024 23:06)  T(F): 97.4 (27 Sep 2024 05:53), Max: 97.8 (26 Sep 2024 23:06)  HR: 68 (27 Sep 2024 05:53) (68 - 95)  BP: 116/79 (27 Sep 2024 05:53) (100/57 - 146/87)  BP(mean): --  RR: 18 (27 Sep 2024 05:53) (18 - 18)  SpO2: 100% (27 Sep 2024 05:53) (95% - 100%)    Parameters below as of 26 Sep 2024 18:08  Patient On (Oxygen Delivery Method): room air        MEDICATIONS  (STANDING):  acetaminophen     Tablet .. 975 milliGRAM(s) Oral <User Schedule>  ascorbic acid 500 milliGRAM(s) Oral daily  diphtheria/tetanus/pertussis (acellular) Vaccine (Adacel) 0.5 milliLiter(s) IntraMuscular once  ferrous    sulfate 325 milliGRAM(s) Oral three times a day  heparin   Injectable 5000 Unit(s) SubCutaneous every 12 hours  ibuprofen  Tablet. 600 milliGRAM(s) Oral every 6 hours  influenza   Vaccine 0.5 milliLiter(s) IntraMuscular once  labetalol 200 milliGRAM(s) Oral two times a day  lactated ringers Bolus 500 milliLiter(s) IV Bolus once  lactated ringers Bolus 500 milliLiter(s) IV Bolus once  lactated ringers. 1000 milliLiter(s) (125 mL/Hr) IV Continuous <Continuous>  lactated ringers. 1000 milliLiter(s) (50 mL/Hr) IV Continuous <Continuous>  NIFEdipine XL 30 milliGRAM(s) Oral daily  oxytocin Infusion 167 milliUNIT(s)/Min (167 mL/Hr) IV Continuous <Continuous>      MEDICATIONS  (PRN):  diphenhydrAMINE 25 milliGRAM(s) Oral every 6 hours PRN Pruritus  lanolin Ointment 1 Application(s) Topical every 6 hours PRN Sore Nipples  magnesium hydroxide Suspension 30 milliLiter(s) Oral two times a day PRN Constipation  oxyCODONE    IR 5 milliGRAM(s) Oral every 3 hours PRN Moderate to Severe Pain (4-10)  oxyCODONE    IR 5 milliGRAM(s) Oral once PRN Moderate to Severe Pain (4-10)  senna 1 Tablet(s) Oral two times a day PRN Constipation  simethicone 80 milliGRAM(s) Chew every 4 hours PRN Gas      Labs:  Blood type: B Positive  Rubella IgG: RPR: Negative                          7.1[L]   8.86 >-----------< 147[L]    ( 09-27 @ 05:20 )             22.0[L]                        8.7[L]   13.16[H] >-----------< 196    ( 09-26 @ 10:33 )             27.1[L]                        10.3[L]   9.86 >-----------< 213    ( 09-25 @ 21:00 )             32.0[L]    09-26-24 @ 10:33      130[L]  |  102  |  13  ----------------------------<  153[H]  4.7   |  17[L]  |  1.01    09-25-24 @ 21:00      136  |  105  |  15  ----------------------------<  83  4.5   |  16[L]  |  1.16        Ca    7.7[L]      26 Sep 2024 10:33  Ca    8.7      25 Sep 2024 21:00  Mg     7.60[HH]     09-26  Mg     6.50[H]     09-26  Mg     5.20[H]     09-26    TPro  5.5[L]  /  Alb  2.6[L]  /  TBili  0.6  /  DBili  x   /  AST  19  /  ALT  5   /  AlkPhos  152[H]  09-26-24 @ 10:33  TPro  6.2  /  Alb  3.1[L]  /  TBili  0.8  /  DBili  x   /  AST  20  /  ALT  <5  /  AlkPhos  182[H]  09-25-24 @ 21:00          PE:  General: NAD, resting comfortably in bed  Pulm: clear breath sounds b/l  Abdomen: Soft, appropriately tender, incision c/d/i.  Extremities: No erythema, no pitting edema OB Progress Note: LTCS, POD#2    S: 38yo POD#2 s/p LTCS. Pain is well controlled. She is tolerating a regular diet. Pt states that she has not yet passed flatus since surgery. Magnesium drip recently discontinued so she has not yet voided spontaneously or been OOB. Overnight, patient states that she had experienced chest tightness when taking deep breath. Patient states that this occurred since she was started on magnesium but has improved since the drip was discontinued. Denies headache, CP, RUQ pain. Denies lightheadedness/dizziness. Denies N/V.    O:  Vitals:  Vital Signs Last 24 Hrs  T(C): 36.3 (27 Sep 2024 05:53), Max: 36.6 (26 Sep 2024 23:06)  T(F): 97.4 (27 Sep 2024 05:53), Max: 97.8 (26 Sep 2024 23:06)  HR: 68 (27 Sep 2024 05:53) (68 - 95)  BP: 116/79 (27 Sep 2024 05:53) (100/57 - 146/87)  BP(mean): --  RR: 18 (27 Sep 2024 05:53) (18 - 18)  SpO2: 100% (27 Sep 2024 05:53) (95% - 100%)    Parameters below as of 26 Sep 2024 18:08  Patient On (Oxygen Delivery Method): room air        MEDICATIONS  (STANDING):  acetaminophen     Tablet .. 975 milliGRAM(s) Oral <User Schedule>  ascorbic acid 500 milliGRAM(s) Oral daily  diphtheria/tetanus/pertussis (acellular) Vaccine (Adacel) 0.5 milliLiter(s) IntraMuscular once  ferrous    sulfate 325 milliGRAM(s) Oral three times a day  heparin   Injectable 5000 Unit(s) SubCutaneous every 12 hours  ibuprofen  Tablet. 600 milliGRAM(s) Oral every 6 hours  influenza   Vaccine 0.5 milliLiter(s) IntraMuscular once  labetalol 200 milliGRAM(s) Oral two times a day  lactated ringers Bolus 500 milliLiter(s) IV Bolus once  lactated ringers Bolus 500 milliLiter(s) IV Bolus once  lactated ringers. 1000 milliLiter(s) (125 mL/Hr) IV Continuous <Continuous>  lactated ringers. 1000 milliLiter(s) (50 mL/Hr) IV Continuous <Continuous>  NIFEdipine XL 30 milliGRAM(s) Oral daily  oxytocin Infusion 167 milliUNIT(s)/Min (167 mL/Hr) IV Continuous <Continuous>      MEDICATIONS  (PRN):  diphenhydrAMINE 25 milliGRAM(s) Oral every 6 hours PRN Pruritus  lanolin Ointment 1 Application(s) Topical every 6 hours PRN Sore Nipples  magnesium hydroxide Suspension 30 milliLiter(s) Oral two times a day PRN Constipation  oxyCODONE    IR 5 milliGRAM(s) Oral every 3 hours PRN Moderate to Severe Pain (4-10)  oxyCODONE    IR 5 milliGRAM(s) Oral once PRN Moderate to Severe Pain (4-10)  senna 1 Tablet(s) Oral two times a day PRN Constipation  simethicone 80 milliGRAM(s) Chew every 4 hours PRN Gas      Labs:  Blood type: B Positive  Rubella IgG: RPR: Negative                          7.1[L]   8.86 >-----------< 147[L]    ( 09-27 @ 05:20 )             22.0[L]                        8.7[L]   13.16[H] >-----------< 196    ( 09-26 @ 10:33 )             27.1[L]                        10.3[L]   9.86 >-----------< 213    ( 09-25 @ 21:00 )             32.0[L]    09-26-24 @ 10:33      130[L]  |  102  |  13  ----------------------------<  153[H]  4.7   |  17[L]  |  1.01    09-25-24 @ 21:00      136  |  105  |  15  ----------------------------<  83  4.5   |  16[L]  |  1.16        Ca    7.7[L]      26 Sep 2024 10:33  Ca    8.7      25 Sep 2024 21:00  Mg     7.60[HH]     09-26  Mg     6.50[H]     09-26  Mg     5.20[H]     09-26    TPro  5.5[L]  /  Alb  2.6[L]  /  TBili  0.6  /  DBili  x   /  AST  19  /  ALT  5   /  AlkPhos  152[H]  09-26-24 @ 10:33  TPro  6.2  /  Alb  3.1[L]  /  TBili  0.8  /  DBili  x   /  AST  20  /  ALT  <5  /  AlkPhos  182[H]  09-25-24 @ 21:00          PE:  General: NAD, resting comfortably in bed  Pulm: clear breath sounds b/l  Abdomen: Moderately distended, appropriately tender, incision c/d/i.  Extremities: No erythema, no pitting edema

## 2024-09-27 NOTE — PROVIDER CONTACT NOTE (OTHER) - ACTION/TREATMENT ORDERED:
12 lead EKG ordered, Najma Suarez MD notified of completion. Incentive spirometer given, encourage ambulation and PO hydration. Simethicone given for gas pains.

## 2024-09-27 NOTE — PROVIDER CONTACT NOTE (OTHER) - ASSESSMENT
State SOB started getting worse last 2 days of pregnancy and continued, but now is slightly worse. States it is hard to take a full deep breath and chest feels tight when she does. Vitals WDL and spo2 100%. Lochia WDL fundus firm and midline. Lungs clear to auscultation, apical pulse WDL. Denies chest pain , dizziness, heart palpitations. Patient on continuos magnesium IV to be stopped at 2255 and has not been OOB since delivery.

## 2024-09-28 LAB
BASOPHILS # BLD AUTO: 0.02 K/UL — SIGNIFICANT CHANGE UP (ref 0–0.2)
BASOPHILS NFR BLD AUTO: 0.2 % — SIGNIFICANT CHANGE UP (ref 0–2)
EOSINOPHIL # BLD AUTO: 0.16 K/UL — SIGNIFICANT CHANGE UP (ref 0–0.5)
EOSINOPHIL NFR BLD AUTO: 1.9 % — SIGNIFICANT CHANGE UP (ref 0–6)
HCT VFR BLD CALC: 23.6 % — LOW (ref 34.5–45)
HGB BLD-MCNC: 7.2 G/DL — LOW (ref 11.5–15.5)
IANC: 6.37 K/UL — SIGNIFICANT CHANGE UP (ref 1.8–7.4)
IMM GRANULOCYTES NFR BLD AUTO: 1 % — HIGH (ref 0–0.9)
LYMPHOCYTES # BLD AUTO: 1.47 K/UL — SIGNIFICANT CHANGE UP (ref 1–3.3)
LYMPHOCYTES # BLD AUTO: 17.1 % — SIGNIFICANT CHANGE UP (ref 13–44)
MCHC RBC-ENTMCNC: 30.3 PG — SIGNIFICANT CHANGE UP (ref 27–34)
MCHC RBC-ENTMCNC: 30.5 GM/DL — LOW (ref 32–36)
MCV RBC AUTO: 99.2 FL — SIGNIFICANT CHANGE UP (ref 80–100)
MONOCYTES # BLD AUTO: 0.51 K/UL — SIGNIFICANT CHANGE UP (ref 0–0.9)
MONOCYTES NFR BLD AUTO: 5.9 % — SIGNIFICANT CHANGE UP (ref 2–14)
NEUTROPHILS # BLD AUTO: 6.37 K/UL — SIGNIFICANT CHANGE UP (ref 1.8–7.4)
NEUTROPHILS NFR BLD AUTO: 73.9 % — SIGNIFICANT CHANGE UP (ref 43–77)
NRBC # BLD: 0 /100 WBCS — SIGNIFICANT CHANGE UP (ref 0–0)
NRBC # FLD: 0.05 K/UL — HIGH (ref 0–0)
PLATELET # BLD AUTO: 214 K/UL — SIGNIFICANT CHANGE UP (ref 150–400)
RBC # BLD: 2.38 M/UL — LOW (ref 3.8–5.2)
RBC # FLD: 16.6 % — HIGH (ref 10.3–14.5)
WBC # BLD: 8.62 K/UL — SIGNIFICANT CHANGE UP (ref 3.8–10.5)
WBC # FLD AUTO: 8.62 K/UL — SIGNIFICANT CHANGE UP (ref 3.8–10.5)

## 2024-09-28 RX ADMIN — Medication 975 MILLIGRAM(S): at 20:47

## 2024-09-28 RX ADMIN — Medication 600 MILLIGRAM(S): at 18:27

## 2024-09-28 RX ADMIN — Medication 600 MILLIGRAM(S): at 00:44

## 2024-09-28 RX ADMIN — LABETALOL HYDROCHLORIDE 200 MILLIGRAM(S): 200 TABLET, FILM COATED ORAL at 10:03

## 2024-09-28 RX ADMIN — Medication 325 MILLIGRAM(S): at 14:23

## 2024-09-28 RX ADMIN — Medication 600 MILLIGRAM(S): at 06:26

## 2024-09-28 RX ADMIN — Medication 30 MILLIGRAM(S): at 12:21

## 2024-09-28 RX ADMIN — Medication 975 MILLIGRAM(S): at 08:09

## 2024-09-28 RX ADMIN — Medication 500 MILLIGRAM(S): at 12:21

## 2024-09-28 RX ADMIN — Medication 80 MILLIGRAM(S): at 14:27

## 2024-09-28 RX ADMIN — Medication 5000 UNIT(S): at 06:27

## 2024-09-28 RX ADMIN — Medication 975 MILLIGRAM(S): at 14:23

## 2024-09-28 RX ADMIN — Medication 600 MILLIGRAM(S): at 17:50

## 2024-09-28 RX ADMIN — Medication 600 MILLIGRAM(S): at 12:21

## 2024-09-28 RX ADMIN — Medication 80 MILLIGRAM(S): at 06:26

## 2024-09-28 RX ADMIN — Medication 975 MILLIGRAM(S): at 09:00

## 2024-09-28 RX ADMIN — Medication 600 MILLIGRAM(S): at 13:15

## 2024-09-28 RX ADMIN — Medication 325 MILLIGRAM(S): at 22:16

## 2024-09-28 RX ADMIN — Medication 325 MILLIGRAM(S): at 06:25

## 2024-09-28 RX ADMIN — Medication 5000 UNIT(S): at 17:50

## 2024-09-28 RX ADMIN — Medication 975 MILLIGRAM(S): at 21:30

## 2024-09-28 RX ADMIN — Medication 600 MILLIGRAM(S): at 00:04

## 2024-09-28 RX ADMIN — Medication 975 MILLIGRAM(S): at 15:15

## 2024-09-28 RX ADMIN — LABETALOL HYDROCHLORIDE 200 MILLIGRAM(S): 200 TABLET, FILM COATED ORAL at 22:16

## 2024-09-28 NOTE — PROGRESS NOTE ADULT - SUBJECTIVE AND OBJECTIVE BOX
OB Postpartum Note:  Delivery, POD#3    S: 40yo POD#3 s/p LTCS. The patient feels well.  Pain is well controlled. She is tolerating a regular diet and passing flatus. She is voiding spontaneously, and ambulating without difficulty. Chest tightness has resolved. Denies headache, CP/SOB, RUQ pain. Denies lightheadedness/dizziness. Denies N/V.    O:  Vitals:  Vital Signs Last 24 Hrs  T(C): 37 (28 Sep 2024 06:00), Max: 37 (28 Sep 2024 06:00)  T(F): 98.6 (28 Sep 2024 06:00), Max: 98.6 (28 Sep 2024 06:00)  HR: 91 (28 Sep 2024 06:00) (83 - 94)  BP: 139/86 (28 Sep 2024 06:00) (113/71 - 139/86)  BP(mean): --  RR: 18 (28 Sep 2024 06:00) (18 - 20)  SpO2: 99% (28 Sep 2024 06:00) (96% - 100%)    Parameters below as of 27 Sep 2024 21:10  Patient On (Oxygen Delivery Method): room air        MEDICATIONS  (STANDING):  acetaminophen     Tablet .. 975 milliGRAM(s) Oral <User Schedule>  ascorbic acid 500 milliGRAM(s) Oral daily  diphtheria/tetanus/pertussis (acellular) Vaccine (Adacel) 0.5 milliLiter(s) IntraMuscular once  ferrous    sulfate 325 milliGRAM(s) Oral three times a day  heparin   Injectable 5000 Unit(s) SubCutaneous every 12 hours  ibuprofen  Tablet. 600 milliGRAM(s) Oral every 6 hours  influenza   Vaccine 0.5 milliLiter(s) IntraMuscular once  labetalol 200 milliGRAM(s) Oral two times a day  lactated ringers Bolus 500 milliLiter(s) IV Bolus once  lactated ringers Bolus 500 milliLiter(s) IV Bolus once  lactated ringers. 1000 milliLiter(s) (125 mL/Hr) IV Continuous <Continuous>  lactated ringers. 1000 milliLiter(s) (50 mL/Hr) IV Continuous <Continuous>  NIFEdipine XL 30 milliGRAM(s) Oral daily  oxytocin Infusion 167 milliUNIT(s)/Min (167 mL/Hr) IV Continuous <Continuous>  polyethylene glycol 3350 17 Gram(s) Oral daily    MEDICATIONS  (PRN):  diphenhydrAMINE 25 milliGRAM(s) Oral every 6 hours PRN Pruritus  lanolin Ointment 1 Application(s) Topical every 6 hours PRN Sore Nipples  magnesium hydroxide Suspension 30 milliLiter(s) Oral two times a day PRN Constipation  ondansetron Injectable 4 milliGRAM(s) IV Push every 6 hours PRN Nausea  oxyCODONE    IR 5 milliGRAM(s) Oral every 3 hours PRN Moderate to Severe Pain (4-10)  oxyCODONE    IR 5 milliGRAM(s) Oral once PRN Moderate to Severe Pain (4-10)  senna 1 Tablet(s) Oral two times a day PRN Constipation  simethicone 80 milliGRAM(s) Chew every 4 hours PRN Gas      LABS:  Blood type: B Positive  Rubella IgG: RPR: Negative                          7.1[L]   8.86 >-----------< 147[L]    (  @ 05:20 )             22.0[L]                        8.7[L]   13.16[H] >-----------< 196    (  @ 10:33 )             27.1[L]                        10.3[L]   9.86 >-----------< 213    (  @ 21:00 )             32.0[L]    24 @ 10:33      130[L]  |  102  |  13  ----------------------------<  153[H]  4.7   |  17[L]  |  1.01    24 @ 21:00      136  |  105  |  15  ----------------------------<  83  4.5   |  16[L]  |  1.16        Ca    7.7[L]      26 Sep 2024 10:33  Ca    8.7      25 Sep 2024 21:00  Mg     7.60[HH]       Mg     6.50[H]       Mg     5.20[H]         TPro  5.5[L]  /  Alb  2.6[L]  /  TBili  0.6  /  DBili  x   /  AST  19  /  ALT  5   /  AlkPhos  152[H]  24 @ 10:33  TPro  6.2  /  Alb  3.1[L]  /  TBili  0.8  /  DBili  x   /  AST  20  /  ALT  <5  /  AlkPhos  182[H]  24 @ 21:00          Physical exam:  Gen: NAD  Abdomen: Soft, nontender, no distension , firm uterine fundus at umbilicus.  Incision: Clean, dry, and intact   Pelvic: Normal lochia noted  Ext: No calf tenderness

## 2024-09-28 NOTE — PROGRESS NOTE ADULT - ASSESSMENT
A/P: 40yo POD#3 s/p pMTCS + BS for di/di TIUP c/b sPEC.  Patient is stable and doing well post-operatively.      #chest tightness  - resolved  - encourage ambulation  - EKG performed 9/27- normal sinus rhythm, slightly prolonged QT interval  - c/w bowel regimen    #sPEC  - s/p Mg and Procardia 10(9/25)  - Cont [id number] QD   - HELLP labs wnl  - cont to monitor for S/s sPEC  - d/c home with BP cuff     #postpartum  - Continue regular diet.  - Increase ambulation.  - Continue motrin, tylenol, oxycodone PRN for pain control.     Amber Suarez PGY1

## 2024-09-29 RX ORDER — PRENATAL VIT,CAL 76/IRON/FOLIC 29 MG-1 MG
1 TABLET ORAL
Qty: 0 | Refills: 0 | DISCHARGE
Start: 2024-09-29

## 2024-09-29 RX ORDER — TETANUS TOXOID, REDUCED DIPHTHERIA TOXOID AND ACELLULAR PERTUSSIS VACCINE, ADSORBED 5; 2.5; 8; 8; 2.5 [IU]/.5ML; [IU]/.5ML; UG/.5ML; UG/.5ML; UG/.5ML
0.5 SUSPENSION INTRAMUSCULAR ONCE
Refills: 0 | Status: COMPLETED | OUTPATIENT
Start: 2024-09-29 | End: 2024-09-29

## 2024-09-29 RX ORDER — ACETAMINOPHEN 325 MG
2 TABLET ORAL
Qty: 0 | Refills: 0 | DISCHARGE
Start: 2024-09-29

## 2024-09-29 RX ORDER — DIPHENHYDRAMINE HCL 12.5MG/5ML
25 LIQUID (ML) ORAL EVERY 6 HOURS
Refills: 0 | Status: DISCONTINUED | OUTPATIENT
Start: 2024-09-29 | End: 2024-09-30

## 2024-09-29 RX ORDER — FERROUS SULFATE 325(65) MG
1 TABLET ORAL
Qty: 0 | Refills: 0 | DISCHARGE
Start: 2024-09-29

## 2024-09-29 RX ADMIN — Medication 5000 UNIT(S): at 18:26

## 2024-09-29 RX ADMIN — Medication 975 MILLIGRAM(S): at 22:28

## 2024-09-29 RX ADMIN — Medication 500 MILLIGRAM(S): at 12:38

## 2024-09-29 RX ADMIN — LABETALOL HYDROCHLORIDE 200 MILLIGRAM(S): 200 TABLET, FILM COATED ORAL at 10:48

## 2024-09-29 RX ADMIN — Medication 25 MILLIGRAM(S): at 02:30

## 2024-09-29 RX ADMIN — Medication 975 MILLIGRAM(S): at 02:30

## 2024-09-29 RX ADMIN — Medication 600 MILLIGRAM(S): at 12:38

## 2024-09-29 RX ADMIN — Medication 5000 UNIT(S): at 06:15

## 2024-09-29 RX ADMIN — Medication 325 MILLIGRAM(S): at 14:39

## 2024-09-29 RX ADMIN — Medication 325 MILLIGRAM(S): at 22:28

## 2024-09-29 RX ADMIN — Medication 600 MILLIGRAM(S): at 18:26

## 2024-09-29 RX ADMIN — Medication 975 MILLIGRAM(S): at 09:57

## 2024-09-29 RX ADMIN — Medication 80 MILLIGRAM(S): at 22:31

## 2024-09-29 RX ADMIN — Medication 600 MILLIGRAM(S): at 13:08

## 2024-09-29 RX ADMIN — Medication 975 MILLIGRAM(S): at 16:17

## 2024-09-29 RX ADMIN — Medication 600 MILLIGRAM(S): at 06:15

## 2024-09-29 RX ADMIN — Medication 975 MILLIGRAM(S): at 03:10

## 2024-09-29 RX ADMIN — Medication 600 MILLIGRAM(S): at 19:00

## 2024-09-29 RX ADMIN — LABETALOL HYDROCHLORIDE 200 MILLIGRAM(S): 200 TABLET, FILM COATED ORAL at 22:28

## 2024-09-29 RX ADMIN — Medication 975 MILLIGRAM(S): at 10:40

## 2024-09-29 RX ADMIN — Medication 60 MILLIGRAM(S): at 12:38

## 2024-09-29 RX ADMIN — Medication 600 MILLIGRAM(S): at 06:56

## 2024-09-29 RX ADMIN — TETANUS TOXOID, REDUCED DIPHTHERIA TOXOID AND ACELLULAR PERTUSSIS VACCINE, ADSORBED 0.5 MILLILITER(S): 5; 2.5; 8; 8; 2.5 SUSPENSION INTRAMUSCULAR at 06:24

## 2024-09-29 RX ADMIN — Medication 17 GRAM(S): at 12:38

## 2024-09-29 RX ADMIN — Medication 975 MILLIGRAM(S): at 17:00

## 2024-09-29 RX ADMIN — Medication 975 MILLIGRAM(S): at 23:00

## 2024-09-29 RX ADMIN — Medication 325 MILLIGRAM(S): at 06:15

## 2024-09-29 NOTE — PROGRESS NOTE ADULT - SUBJECTIVE AND OBJECTIVE BOX
Patient seen and examined at bedside, no acute overnight events. No acute complaints, pain well controlled. Patient is ambulating and tolerating regular diet. Has passed flatus and had a BM. Voiding spontaneously. Denies CP, SOB, N/V, HA, blurred vision, epigastric pain.    Vital Signs Last 24 Hours  T(C): 37.1 (09-29-24 @ 06:32), Max: 37.1 (09-28-24 @ 21:26)  HR: 92 (09-29-24 @ 06:32) (84 - 100)  BP: 146/84 (09-29-24 @ 06:47) (114/70 - 150/89)  RR: 18 (09-29-24 @ 06:32) (16 - 18)  SpO2: 99% (09-29-24 @ 06:32) (99% - 100%)    I&O's Summary      Physical Exam:  General: NAD  Abdomen: Soft, non-tender, non-distended, fundus firm  Incision: Pfannenstiel incision CDI  Pelvic: Lochia wnl, external exam of perineum clean and dry without swelling    Labs:    Blood Type: B Positive  Antibody Screen: Negative  RPR: Negative               7.2    8.62  )-----------( 214      ( 09-28 @ 18:38 )             23.6                7.1    8.86  )-----------( 147      ( 09-27 @ 05:20 )             22.0                8.7    13.16 )-----------( 196      ( 09-26 @ 10:33 )             27.1         MEDICATIONS  (STANDING):  acetaminophen     Tablet .. 975 milliGRAM(s) Oral <User Schedule>  ascorbic acid 500 milliGRAM(s) Oral daily  ferrous    sulfate 325 milliGRAM(s) Oral three times a day  heparin   Injectable 5000 Unit(s) SubCutaneous every 12 hours  ibuprofen  Tablet. 600 milliGRAM(s) Oral every 6 hours  influenza   Vaccine 0.5 milliLiter(s) IntraMuscular once  labetalol 200 milliGRAM(s) Oral two times a day  lactated ringers Bolus 500 milliLiter(s) IV Bolus once  lactated ringers Bolus 500 milliLiter(s) IV Bolus once  lactated ringers. 1000 milliLiter(s) (125 mL/Hr) IV Continuous <Continuous>  lactated ringers. 1000 milliLiter(s) (50 mL/Hr) IV Continuous <Continuous>  NIFEdipine XL 30 milliGRAM(s) Oral daily  oxytocin Infusion 167 milliUNIT(s)/Min (167 mL/Hr) IV Continuous <Continuous>  polyethylene glycol 3350 17 Gram(s) Oral daily    MEDICATIONS  (PRN):  diphenhydrAMINE 25 milliGRAM(s) Oral every 6 hours PRN Pruritus  lanolin Ointment 1 Application(s) Topical every 6 hours PRN Sore Nipples  magnesium hydroxide Suspension 30 milliLiter(s) Oral two times a day PRN Constipation  ondansetron Injectable 4 milliGRAM(s) IV Push every 6 hours PRN Nausea  oxyCODONE    IR 5 milliGRAM(s) Oral every 3 hours PRN Moderate to Severe Pain (4-10)  oxyCODONE    IR 5 milliGRAM(s) Oral once PRN Moderate to Severe Pain (4-10)  senna 1 Tablet(s) Oral two times a day PRN Constipation  simethicone 80 milliGRAM(s) Chew every 4 hours PRN Gas

## 2024-09-29 NOTE — PROGRESS NOTE ADULT - ASSESSMENT
A/P: 38yo POD#4 s/p pMTCS + BS for di/di TIUP c/b sPEC.  Patient is stable and doing well post-operatively.      #sPEC  - s/p Mg and Procardia 10(9/25)  - Cont [id number] QD and labetalol 200 mg BID  - HELLP labs wnl  - cont to monitor for S/s sPEC    #postpartum  - Continue regular diet.  - Increase ambulation.  - Continue motrin, tylenol, oxycodone PRN for pain control.     Kacie Rossi, PGY1

## 2024-09-29 NOTE — PROGRESS NOTE ADULT - ATTENDING COMMENTS
ATT:  Pt seen and examined by me today. I agree with findings, assessment and plan documented in resident note.  Will repeat cbc for low H/H yesterday. Pt asymptomatic. Lindsey Fowler MD
BPs still elevated overnight  will increase procardia to 60 mg  steristrips came off incision and felt moist  gauze placed and emphasized importance of keeping it clean and dry
Patient seen at bedside, agree with assessment and plan as above.  Chest tightness has improved. EKG normal sinus rhythm, Denies passing flatus or having a BM  Physical exam wnl. Bilateral LE edema, calves non tender.  Continue bowel regimen, encouraged ambulation    Fam Frausto MD

## 2024-09-30 ENCOUNTER — APPOINTMENT (OUTPATIENT)
Dept: ANTEPARTUM | Facility: CLINIC | Age: 39
End: 2024-09-30

## 2024-09-30 VITALS
SYSTOLIC BLOOD PRESSURE: 130 MMHG | HEART RATE: 92 BPM | DIASTOLIC BLOOD PRESSURE: 78 MMHG | TEMPERATURE: 99 F | RESPIRATION RATE: 16 BRPM | OXYGEN SATURATION: 100 %

## 2024-09-30 RX ORDER — LABETALOL HYDROCHLORIDE 200 MG/1
1 TABLET, FILM COATED ORAL
Qty: 60 | Refills: 0
Start: 2024-09-30 | End: 2024-10-29

## 2024-09-30 RX ORDER — ANTI-ITCH CREAM 1 G/100G
1 OINTMENT TOPICAL
Refills: 0 | Status: DISCONTINUED | OUTPATIENT
Start: 2024-09-30 | End: 2024-09-30

## 2024-09-30 RX ADMIN — Medication 975 MILLIGRAM(S): at 18:20

## 2024-09-30 RX ADMIN — Medication 600 MILLIGRAM(S): at 13:50

## 2024-09-30 RX ADMIN — Medication 975 MILLIGRAM(S): at 04:00

## 2024-09-30 RX ADMIN — Medication 975 MILLIGRAM(S): at 17:26

## 2024-09-30 RX ADMIN — Medication 325 MILLIGRAM(S): at 07:06

## 2024-09-30 RX ADMIN — Medication 25 MILLIGRAM(S): at 17:10

## 2024-09-30 RX ADMIN — Medication 25 MILLIGRAM(S): at 02:09

## 2024-09-30 RX ADMIN — Medication 600 MILLIGRAM(S): at 12:59

## 2024-09-30 RX ADMIN — LABETALOL HYDROCHLORIDE 200 MILLIGRAM(S): 200 TABLET, FILM COATED ORAL at 10:28

## 2024-09-30 RX ADMIN — Medication 60 MILLIGRAM(S): at 12:59

## 2024-09-30 RX ADMIN — Medication 5000 UNIT(S): at 06:11

## 2024-09-30 RX ADMIN — Medication 600 MILLIGRAM(S): at 06:30

## 2024-09-30 RX ADMIN — Medication 600 MILLIGRAM(S): at 02:05

## 2024-09-30 RX ADMIN — Medication 600 MILLIGRAM(S): at 07:06

## 2024-09-30 RX ADMIN — Medication 600 MILLIGRAM(S): at 06:11

## 2024-09-30 RX ADMIN — ANTI-ITCH CREAM 1 APPLICATION(S): 1 OINTMENT TOPICAL at 17:10

## 2024-09-30 RX ADMIN — Medication 975 MILLIGRAM(S): at 10:29

## 2024-09-30 RX ADMIN — Medication 975 MILLIGRAM(S): at 11:30

## 2024-09-30 RX ADMIN — ANTI-ITCH CREAM 1 APPLICATION(S): 1 OINTMENT TOPICAL at 04:01

## 2024-09-30 RX ADMIN — Medication 600 MILLIGRAM(S): at 00:31

## 2024-09-30 RX ADMIN — Medication 975 MILLIGRAM(S): at 04:30

## 2024-09-30 RX ADMIN — INFLUENZA VIRUS VACCINE 0.5 MILLILITER(S): 15; 15; 15; 15 SUSPENSION INTRAMUSCULAR at 06:10

## 2024-09-30 NOTE — PROGRESS NOTE ADULT - SUBJECTIVE AND OBJECTIVE BOX
OB Postpartum Note:  Delivery, POD#5    S: 40yo POD#5 s/p pMTCS+BS. The patient feels well.  Pain is well controlled. She is tolerating a regular diet and passing flatus. She is voiding spontaneously, and ambulating without difficulty. Pt has been experiencing itchiness surrounding incision site. Pt states that when she had a lap ovarian cystectomy years ago, she had a similar reaction to the steri strips. She states that this feels and looks the same way. She feels itchy and like she "wants to rip skin off". The steri strips have all come off at this time. She is applying gauze to the area. Area is weeping serous fluid. Denies fever, chills, CP/SOB. Denies lightheadedness/dizziness. Denies N/V.    O:  Vitals:  Vital Signs Last 24 Hrs  T(C): 37 (29 Sep 2024 21:54), Max: 37.2 (29 Sep 2024 10:00)  T(F): 98.6 (29 Sep 2024 21:54), Max: 99 (29 Sep 2024 10:00)  HR: 103 (29 Sep 2024 22:28) (87 - 106)  BP: 136/81 (29 Sep 2024 22:28) (136/81 - 150/89)  BP(mean): --  RR: 17 (29 Sep 2024 21:54) (17 - 18)  SpO2: 100% (29 Sep 2024 22:28) (98% - 100%)    Parameters below as of 29 Sep 2024 22:28  Patient On (Oxygen Delivery Method): room air        MEDICATIONS  (STANDING):  acetaminophen     Tablet .. 975 milliGRAM(s) Oral <User Schedule>  ascorbic acid 500 milliGRAM(s) Oral daily  ferrous    sulfate 325 milliGRAM(s) Oral three times a day  heparin   Injectable 5000 Unit(s) SubCutaneous every 12 hours  hydrocortisone 1% Cream 1 Application(s) Topical two times a day  ibuprofen  Tablet. 600 milliGRAM(s) Oral every 6 hours  influenza   Vaccine 0.5 milliLiter(s) IntraMuscular once  labetalol 200 milliGRAM(s) Oral two times a day  lactated ringers Bolus 500 milliLiter(s) IV Bolus once  lactated ringers Bolus 500 milliLiter(s) IV Bolus once  NIFEdipine XL 60 milliGRAM(s) Oral daily  polyethylene glycol 3350 17 Gram(s) Oral daily    MEDICATIONS  (PRN):  diphenhydrAMINE 25 milliGRAM(s) Oral every 6 hours PRN Pruritus  lanolin Ointment 1 Application(s) Topical every 6 hours PRN Sore Nipples  magnesium hydroxide Suspension 30 milliLiter(s) Oral two times a day PRN Constipation  oxyCODONE    IR 5 milliGRAM(s) Oral once PRN Moderate to Severe Pain (4-10)  oxyCODONE    IR 5 milliGRAM(s) Oral every 3 hours PRN Moderate to Severe Pain (4-10)  senna 1 Tablet(s) Oral two times a day PRN Constipation  simethicone 80 milliGRAM(s) Chew every 4 hours PRN Gas      LABS:  Blood type: B Positive  Rubella IgG: RPR: Negative                          7.2[L]   8.62 >-----------< 214    (  @ 18:38 )             23.6[L]                        7.1[L]   8.86 >-----------< 147[L]    (  @ 05:20 )             22.0[L]                  Physical exam:  Gen: NAD  Abdomen: Soft, nontender, no distension , firm uterine fundus at umbilicus.    Incision: Clean, dry, and intact. Pressed area surrounding incision and no fluid was expelled. skin around incision looks erythematous and irritated. She also has papules that are spreading from where steri strips were placed. No signs of infection visible. No bleeding or pus from/around incision   Pelvic: Normal lochia noted  Ext: No calf tenderness

## 2024-09-30 NOTE — PROGRESS NOTE ADULT - SUBJECTIVE AND OBJECTIVE BOX
Attg  POD # 5, s/p primary C/S and BS Twins- Placenta previa and preeclampsia. pt feels well today and is desirous of going home. pt is currently on Labetalol 200mg BID and Procardia 60mg. pt has mild skin edema mitzi-incisionally.  will discharge home today with scheduled f/u on Friday for B.P. check and antihypertensive adjustment.  ROSEANNE Doherty M.D.

## 2024-09-30 NOTE — PROGRESS NOTE ADULT - ASSESSMENT
A/P: 40yo POD#5 s/p pMTCS + BS for di/di TIUP c/b sPEC.  Patient is stable and doing well post-operatively.      #skin reaction  - likely allergic to steri strips d/t hx of similar rxn   - Give benadryl PO and topical hydrocortisone     #sPEC  - s/p Mg and Procardia 10(9/25)  - Cont [id number] QD and labetalol 200 mg BID  - HELLP labs wnl  - cont to monitor for S/s sPEC    #postpartum  - Continue regular diet.  - Increase ambulation.  - Continue motrin, tylenol, oxycodone PRN for pain control.     D/w Dr. Haris uSarez PGY1

## 2024-10-04 ENCOUNTER — APPOINTMENT (OUTPATIENT)
Dept: OBGYN | Facility: HOSPITAL | Age: 39
End: 2024-10-04

## 2024-10-09 ENCOUNTER — APPOINTMENT (OUTPATIENT)
Dept: OBGYN | Facility: CLINIC | Age: 39
End: 2024-10-09
Payer: COMMERCIAL

## 2024-10-09 VITALS
DIASTOLIC BLOOD PRESSURE: 83 MMHG | WEIGHT: 195 LBS | HEART RATE: 91 BPM | HEIGHT: 64 IN | SYSTOLIC BLOOD PRESSURE: 125 MMHG | BODY MASS INDEX: 33.29 KG/M2

## 2024-10-09 PROCEDURE — 0503F POSTPARTUM CARE VISIT: CPT

## 2024-10-16 NOTE — OB PROVIDER DELIVERY SUMMARY - NSPPHRISKEVAL_OBGYN_ALL_OB
FYI - Status Update    Who is Calling: pharmacy    Update: pharmacy states they will be doing capsules instead of tablets.    Does caller want a call/response back: No   Over distended uterus (polyhydramnios, macrosomia, multiple gestation)/Use of magnesium sulfate

## 2025-01-07 NOTE — OB PROVIDER LABOR PROGRESS NOTE - NS_STATION_OBGYN_ALL_OB_NU
Per patient chart, lab ordered by PCP. Nurse at [facility] unavailable - writer left message with Nicky  at [facility] to please notify nurse in care of patient that PCP is managing these results and if they feel it is appropriate to have Dr. Koroma made aware of results to contact our office.   
-1
-3

## 2025-02-11 ENCOUNTER — ASOB RESULT (OUTPATIENT)
Age: 40
End: 2025-02-11

## 2025-02-11 ENCOUNTER — APPOINTMENT (OUTPATIENT)
Dept: OBGYN | Facility: CLINIC | Age: 40
End: 2025-02-11
Payer: COMMERCIAL

## 2025-02-11 VITALS
DIASTOLIC BLOOD PRESSURE: 71 MMHG | BODY MASS INDEX: 34.66 KG/M2 | HEART RATE: 93 BPM | SYSTOLIC BLOOD PRESSURE: 119 MMHG | HEIGHT: 64 IN | WEIGHT: 203 LBS

## 2025-02-11 DIAGNOSIS — N80.03 ADENOMYOSIS OF THE UTERUS: ICD-10-CM

## 2025-02-11 DIAGNOSIS — N92.0 EXCESSIVE AND FREQUENT MENSTRUATION WITH REGULAR CYCLE: ICD-10-CM

## 2025-02-11 PROCEDURE — 99214 OFFICE O/P EST MOD 30 MIN: CPT

## 2025-02-11 PROCEDURE — 99459 PELVIC EXAMINATION: CPT

## 2025-02-11 PROCEDURE — 76830 TRANSVAGINAL US NON-OB: CPT

## 2025-02-11 RX ORDER — VALACYCLOVIR 500 MG/1
500 TABLET, FILM COATED ORAL
Qty: 60 | Refills: 2 | Status: ACTIVE | COMMUNITY
Start: 2025-02-11 | End: 1900-01-01

## 2025-02-11 RX ORDER — TIRZEPATIDE 15 MG/.5ML
INJECTION, SOLUTION SUBCUTANEOUS
Refills: 0 | Status: ACTIVE | COMMUNITY

## 2025-02-11 RX ORDER — TRANEXAMIC ACID 650 MG/1
650 TABLET ORAL
Qty: 30 | Refills: 3 | Status: ACTIVE | COMMUNITY
Start: 2025-02-11 | End: 1900-01-01

## 2025-03-17 NOTE — PROGRESS NOTE ADULT - ASSESSMENT
A/P: 40yo POD#1 s/p pMTCS + BS for di/di TIUP c/b sPEC.  Patient is stable and doing well post-operatively.      #sPEC  - Magnesium for seizure prophylaxis, cont 24h postpartum  - s/p Procardia 10(9/25)  - Cont [id number] QD   - HELLP labs wnl  - cont to monitor for S/s sPEC  - d/c home with BP cuff   #postpartum  - Continue regular diet.  - Increase ambulation.  - Continue motrin, tylenol, oxycodone PRN for pain control. vs. D/c PCEA today and transition to PO pain medication with motrin, tylenol and oxycodone PRN.     Amber Suarez, PGY1 EMS NS bolus restarted    A/P: 40yo POD#1 s/p pMTCS + BS for di/di TIUP c/b sPEC.  Patient is stable and doing well post-operatively.      #chest tightness  - moderately distended, has not passed flatus  - encourage ambulation  - EKG performed- normal sinus rhythm, slightly prolonged QT interval  - VSS, O2 sat %  - symptoms have improved  - start bowel regimen- senna, miralax    #sPEC  - s/p Mg and Procardia 10(9/25)  - Cont [id number] QD   - HELLP labs wnl  - cont to monitor for S/s sPEC  - d/c home with BP cuff     #postpartum  - Continue regular diet.  - Increase ambulation.  - Continue motrin, tylenol, oxycodone PRN for pain control.     d/w Dr. Jett Suarez PGY1  Amber Suarez, PGY1 A/P: 40yo POD#2 s/p pMTCS + BS for di/di TIUP c/b sPEC.  Patient is stable and doing well post-operatively.      #chest tightness  - moderately distended, has not passed flatus  - encourage ambulation  - EKG performed- normal sinus rhythm, slightly prolonged QT interval  - VSS, O2 sat %  - symptoms have improved  - start bowel regimen- senna, miralax    #sPEC  - s/p Mg and Procardia 10(9/25)  - Cont [id number] QD   - HELLP labs wnl  - cont to monitor for S/s sPEC  - d/c home with BP cuff     #postpartum  - Continue regular diet.  - Increase ambulation.  - Continue motrin, tylenol, oxycodone PRN for pain control.     d/w Dr. Jett Suarez PGY1  Amber Suarez, PGY1

## 2025-03-18 ENCOUNTER — RX RENEWAL (OUTPATIENT)
Age: 40
End: 2025-03-18

## 2025-04-01 ENCOUNTER — APPOINTMENT (OUTPATIENT)
Dept: INTERNAL MEDICINE | Facility: CLINIC | Age: 40
End: 2025-04-01

## 2025-07-08 ENCOUNTER — RX RENEWAL (OUTPATIENT)
Age: 40
End: 2025-07-08